# Patient Record
Sex: MALE | Race: BLACK OR AFRICAN AMERICAN | NOT HISPANIC OR LATINO | Employment: OTHER | URBAN - METROPOLITAN AREA
[De-identification: names, ages, dates, MRNs, and addresses within clinical notes are randomized per-mention and may not be internally consistent; named-entity substitution may affect disease eponyms.]

---

## 2019-01-27 ENCOUNTER — HOSPITAL ENCOUNTER (OUTPATIENT)
Dept: URGENT CARE | Facility: CLINIC | Age: 44
Discharge: HOME | End: 2019-01-27

## 2019-01-29 LAB — BACTERIA SPEC AEROBE CULT: NORMAL

## 2019-08-24 ENCOUNTER — HOSPITAL ENCOUNTER (OUTPATIENT)
Dept: URGENT CARE | Facility: CLINIC | Age: 44
Discharge: HOME | End: 2019-08-24
Attending: NURSE PRACTITIONER

## 2019-10-29 ENCOUNTER — CONVERSION ENCOUNTER (OUTPATIENT)
Dept: FAMILY MEDICINE CLINIC | Facility: CLINIC | Age: 44
End: 2019-10-29

## 2019-10-29 ENCOUNTER — OFFICE VISIT CONVERTED (OUTPATIENT)
Dept: FAMILY MEDICINE CLINIC | Facility: CLINIC | Age: 44
End: 2019-10-29
Attending: NURSE PRACTITIONER

## 2020-01-14 ENCOUNTER — HOSPITAL ENCOUNTER (OUTPATIENT)
Dept: URGENT CARE | Facility: CLINIC | Age: 45
Discharge: HOME | End: 2020-01-14
Attending: NURSE PRACTITIONER

## 2020-01-17 LAB — BACTERIA SPEC AEROBE CULT: NORMAL

## 2020-02-08 ENCOUNTER — HOSPITAL ENCOUNTER (OUTPATIENT)
Dept: URGENT CARE | Facility: CLINIC | Age: 45
Discharge: HOME | End: 2020-02-08

## 2020-02-20 ENCOUNTER — HOSPITAL ENCOUNTER (OUTPATIENT)
Dept: LAB | Facility: HOSPITAL | Age: 45
Discharge: HOME | End: 2020-02-20
Attending: NURSE PRACTITIONER

## 2020-02-20 LAB
25(OH)D3 SERPL-MCNC: 19.5 NG/ML (ref 30–100)
APPEARANCE UR: CLEAR
BILIRUB UR QL: NEGATIVE
COLOR UR: YELLOW
CONV COLLECTION SOURCE (UA): NORMAL
CONV UROBILINOGEN IN URINE BY AUTOMATED TEST STRIP: 0.2 {EHRLICHU}/DL (ref 0.1–1)
GLUCOSE UR QL: NEGATIVE MG/DL
HGB UR QL STRIP: NEGATIVE
KETONES UR QL STRIP: NEGATIVE MG/DL
LEUKOCYTE ESTERASE UR QL STRIP: NEGATIVE
NITRITE UR QL STRIP: NEGATIVE
PH UR STRIP.AUTO: 5 [PH] (ref 5–8)
PROT UR QL: NEGATIVE MG/DL
SP GR UR: 1.01 (ref 1–1.03)
TESTOST SERPL-MCNC: 165 NG/DL (ref 249–836)

## 2020-02-24 ENCOUNTER — OFFICE VISIT CONVERTED (OUTPATIENT)
Dept: FAMILY MEDICINE CLINIC | Facility: CLINIC | Age: 45
End: 2020-02-24
Attending: NURSE PRACTITIONER

## 2020-03-09 ENCOUNTER — OFFICE VISIT CONVERTED (OUTPATIENT)
Dept: FAMILY MEDICINE CLINIC | Facility: CLINIC | Age: 45
End: 2020-03-09
Attending: NURSE PRACTITIONER

## 2020-05-04 ENCOUNTER — CONVERSION ENCOUNTER (OUTPATIENT)
Dept: FAMILY MEDICINE CLINIC | Facility: CLINIC | Age: 45
End: 2020-05-04

## 2020-05-04 ENCOUNTER — OFFICE VISIT CONVERTED (OUTPATIENT)
Dept: FAMILY MEDICINE CLINIC | Facility: CLINIC | Age: 45
End: 2020-05-04
Attending: NURSE PRACTITIONER

## 2020-05-22 ENCOUNTER — HOSPITAL ENCOUNTER (OUTPATIENT)
Dept: PHYSICAL THERAPY | Facility: CLINIC | Age: 45
Setting detail: RECURRING SERIES
Discharge: HOME | End: 2020-09-18
Attending: NURSE PRACTITIONER

## 2020-06-01 ENCOUNTER — HOSPITAL ENCOUNTER (OUTPATIENT)
Dept: LAB | Facility: HOSPITAL | Age: 45
Discharge: HOME | End: 2020-06-01
Attending: NURSE PRACTITIONER

## 2020-06-01 LAB
25(OH)D3 SERPL-MCNC: 22.7 NG/ML (ref 30–100)
ALBUMIN SERPL-MCNC: 4.6 G/DL (ref 3.5–5)
ALBUMIN/GLOB SERPL: 1.6 {RATIO} (ref 1.4–2.6)
ALP SERPL-CCNC: 70 U/L (ref 53–128)
ALT SERPL-CCNC: 34 U/L (ref 10–40)
ANION GAP SERPL CALC-SCNC: 19 MMOL/L (ref 8–19)
AST SERPL-CCNC: 37 U/L (ref 15–50)
BASOPHILS # BLD AUTO: 0.04 10*3/UL (ref 0–0.2)
BASOPHILS NFR BLD AUTO: 0.9 % (ref 0–3)
BILIRUB SERPL-MCNC: 0.83 MG/DL (ref 0.2–1.3)
BUN SERPL-MCNC: 12 MG/DL (ref 5–25)
BUN/CREAT SERPL: 9 {RATIO} (ref 6–20)
CALCIUM SERPL-MCNC: 9.4 MG/DL (ref 8.7–10.4)
CHLORIDE SERPL-SCNC: 104 MMOL/L (ref 99–111)
CONV ABS IMM GRAN: 0.02 10*3/UL (ref 0–0.2)
CONV CO2: 22 MMOL/L (ref 22–32)
CONV IMMATURE GRAN: 0.4 % (ref 0–1.8)
CONV TOTAL PROTEIN: 7.4 G/DL (ref 6.3–8.2)
CREAT UR-MCNC: 1.32 MG/DL (ref 0.7–1.2)
DEPRECATED RDW RBC AUTO: 43.1 FL (ref 35.1–43.9)
EOSINOPHIL # BLD AUTO: 0.07 10*3/UL (ref 0–0.7)
EOSINOPHIL # BLD AUTO: 1.5 % (ref 0–7)
ERYTHROCYTE [DISTWIDTH] IN BLOOD BY AUTOMATED COUNT: 12.9 % (ref 11.6–14.4)
GFR SERPLBLD BASED ON 1.73 SQ M-ARVRAT: >60 ML/MIN/{1.73_M2}
GLOBULIN UR ELPH-MCNC: 2.8 G/DL (ref 2–3.5)
GLUCOSE SERPL-MCNC: 120 MG/DL (ref 70–99)
HCT VFR BLD AUTO: 39.7 % (ref 42–52)
HGB BLD-MCNC: 13 G/DL (ref 14–18)
LYMPHOCYTES # BLD AUTO: 1.69 10*3/UL (ref 1–5)
LYMPHOCYTES NFR BLD AUTO: 36.8 % (ref 20–45)
MCH RBC QN AUTO: 29.9 PG (ref 27–31)
MCHC RBC AUTO-ENTMCNC: 32.7 G/DL (ref 33–37)
MCV RBC AUTO: 91.3 FL (ref 80–96)
MONOCYTES # BLD AUTO: 0.3 10*3/UL (ref 0.2–1.2)
MONOCYTES NFR BLD AUTO: 6.5 % (ref 3–10)
NEUTROPHILS # BLD AUTO: 2.47 10*3/UL (ref 2–8)
NEUTROPHILS NFR BLD AUTO: 53.9 % (ref 30–85)
NRBC CBCN: 0 % (ref 0–0.7)
OSMOLALITY SERPL CALC.SUM OF ELEC: 293 MOSM/KG (ref 273–304)
PLATELET # BLD AUTO: 196 10*3/UL (ref 130–400)
PMV BLD AUTO: 10.6 FL (ref 9.4–12.4)
POTASSIUM SERPL-SCNC: 4 MMOL/L (ref 3.5–5.3)
RBC # BLD AUTO: 4.35 10*6/UL (ref 4.7–6.1)
SODIUM SERPL-SCNC: 141 MMOL/L (ref 135–147)
TESTOST SERPL-MCNC: 166 NG/DL (ref 249–836)
WBC # BLD AUTO: 4.59 10*3/UL (ref 4.8–10.8)

## 2020-12-16 ENCOUNTER — OFFICE VISIT CONVERTED (OUTPATIENT)
Dept: FAMILY MEDICINE CLINIC | Facility: CLINIC | Age: 45
End: 2020-12-16
Attending: STUDENT IN AN ORGANIZED HEALTH CARE EDUCATION/TRAINING PROGRAM

## 2021-01-29 ENCOUNTER — OFFICE VISIT CONVERTED (OUTPATIENT)
Dept: FAMILY MEDICINE CLINIC | Facility: CLINIC | Age: 46
End: 2021-01-29
Attending: NURSE PRACTITIONER

## 2021-01-29 ENCOUNTER — CONVERSION ENCOUNTER (OUTPATIENT)
Dept: FAMILY MEDICINE CLINIC | Facility: CLINIC | Age: 46
End: 2021-01-29

## 2021-02-04 ENCOUNTER — HOSPITAL ENCOUNTER (OUTPATIENT)
Dept: LAB | Facility: HOSPITAL | Age: 46
Discharge: HOME | End: 2021-02-04
Attending: NURSE PRACTITIONER

## 2021-02-04 LAB
ALBUMIN SERPL-MCNC: 4.3 G/DL (ref 3.5–5)
ALBUMIN/GLOB SERPL: 1.5 {RATIO} (ref 1.4–2.6)
ALP SERPL-CCNC: 89 U/L (ref 53–128)
ALT SERPL-CCNC: 25 U/L (ref 10–40)
ANION GAP SERPL CALC-SCNC: 17 MMOL/L (ref 8–19)
AST SERPL-CCNC: 24 U/L (ref 15–50)
BASOPHILS # BLD AUTO: 0.04 10*3/UL (ref 0–0.2)
BASOPHILS NFR BLD AUTO: 0.8 % (ref 0–3)
BILIRUB SERPL-MCNC: 1.17 MG/DL (ref 0.2–1.3)
BUN SERPL-MCNC: 12 MG/DL (ref 5–25)
BUN/CREAT SERPL: 10 {RATIO} (ref 6–20)
CALCIUM SERPL-MCNC: 8.9 MG/DL (ref 8.7–10.4)
CHLORIDE SERPL-SCNC: 102 MMOL/L (ref 99–111)
CHOLEST SERPL-MCNC: 205 MG/DL (ref 107–200)
CHOLEST/HDLC SERPL: 4.5 {RATIO} (ref 3–6)
CONV ABS IMM GRAN: 0.01 10*3/UL (ref 0–0.2)
CONV CO2: 27 MMOL/L (ref 22–32)
CONV IMMATURE GRAN: 0.2 % (ref 0–1.8)
CONV TOTAL PROTEIN: 7.1 G/DL (ref 6.3–8.2)
CREAT UR-MCNC: 1.19 MG/DL (ref 0.7–1.2)
DEPRECATED RDW RBC AUTO: 40.7 FL (ref 35.1–43.9)
EOSINOPHIL # BLD AUTO: 0.09 10*3/UL (ref 0–0.7)
EOSINOPHIL # BLD AUTO: 1.7 % (ref 0–7)
ERYTHROCYTE [DISTWIDTH] IN BLOOD BY AUTOMATED COUNT: 12.9 % (ref 11.6–14.4)
EST. AVERAGE GLUCOSE BLD GHB EST-MCNC: 123 MG/DL
GFR SERPLBLD BASED ON 1.73 SQ M-ARVRAT: >60 ML/MIN/{1.73_M2}
GLOBULIN UR ELPH-MCNC: 2.8 G/DL (ref 2–3.5)
GLUCOSE SERPL-MCNC: 99 MG/DL (ref 70–99)
HBA1C MFR BLD: 5.9 % (ref 3.5–5.7)
HCT VFR BLD AUTO: 41 % (ref 42–52)
HDLC SERPL-MCNC: 46 MG/DL (ref 40–60)
HGB BLD-MCNC: 13.9 G/DL (ref 14–18)
LDLC SERPL CALC-MCNC: 98 MG/DL (ref 70–100)
LYMPHOCYTES # BLD AUTO: 2.59 10*3/UL (ref 1–5)
LYMPHOCYTES NFR BLD AUTO: 49.3 % (ref 20–45)
MCH RBC QN AUTO: 29.4 PG (ref 27–31)
MCHC RBC AUTO-ENTMCNC: 33.9 G/DL (ref 33–37)
MCV RBC AUTO: 86.9 FL (ref 80–96)
MONOCYTES # BLD AUTO: 0.31 10*3/UL (ref 0.2–1.2)
MONOCYTES NFR BLD AUTO: 5.9 % (ref 3–10)
NEUTROPHILS # BLD AUTO: 2.21 10*3/UL (ref 2–8)
NEUTROPHILS NFR BLD AUTO: 42.1 % (ref 30–85)
NRBC CBCN: 0 % (ref 0–0.7)
OSMOLALITY SERPL CALC.SUM OF ELEC: 294 MOSM/KG (ref 273–304)
PLATELET # BLD AUTO: 169 10*3/UL (ref 130–400)
PMV BLD AUTO: 9.9 FL (ref 9.4–12.4)
POTASSIUM SERPL-SCNC: 3.5 MMOL/L (ref 3.5–5.3)
RBC # BLD AUTO: 4.72 10*6/UL (ref 4.7–6.1)
SODIUM SERPL-SCNC: 142 MMOL/L (ref 135–147)
TRIGL SERPL-MCNC: 304 MG/DL (ref 40–150)
TSH SERPL-ACNC: 2.83 M[IU]/L (ref 0.27–4.2)
VLDLC SERPL-MCNC: 61 MG/DL (ref 5–37)
WBC # BLD AUTO: 5.25 10*3/UL (ref 4.8–10.8)

## 2021-03-04 ENCOUNTER — OFFICE VISIT CONVERTED (OUTPATIENT)
Dept: NEUROSURGERY | Facility: CLINIC | Age: 46
End: 2021-03-04
Attending: PHYSICIAN ASSISTANT

## 2021-03-09 ENCOUNTER — HOSPITAL ENCOUNTER (OUTPATIENT)
Dept: LAB | Facility: HOSPITAL | Age: 46
Discharge: HOME | End: 2021-03-09
Attending: NURSE PRACTITIONER

## 2021-03-09 ENCOUNTER — OFFICE VISIT CONVERTED (OUTPATIENT)
Dept: FAMILY MEDICINE CLINIC | Facility: CLINIC | Age: 46
End: 2021-03-09
Attending: NURSE PRACTITIONER

## 2021-03-09 LAB — TESTOST SERPL-MCNC: 190 NG/DL (ref 249–836)

## 2021-04-28 ENCOUNTER — HOSPITAL ENCOUNTER (OUTPATIENT)
Dept: LAB | Facility: HOSPITAL | Age: 46
Discharge: HOME | End: 2021-04-28
Attending: NURSE PRACTITIONER

## 2021-04-28 ENCOUNTER — OFFICE VISIT CONVERTED (OUTPATIENT)
Dept: FAMILY MEDICINE CLINIC | Facility: CLINIC | Age: 46
End: 2021-04-28
Attending: NURSE PRACTITIONER

## 2021-04-28 ENCOUNTER — CONVERSION ENCOUNTER (OUTPATIENT)
Dept: FAMILY MEDICINE CLINIC | Facility: CLINIC | Age: 46
End: 2021-04-28

## 2021-04-29 LAB — HCV AB S/CO SERPL IA: <0.1 S/CO RATIO (ref 0–0.9)

## 2021-05-10 NOTE — H&P
History and Physical      Patient Name: Sameer Méndez   Patient ID: [id number]   Sex: Male   YOB: 1975    Primary Care Provider: Tre VALIENTE   Referring Provider: Tre VALIENTE    Visit Date: March 4, 2021    Provider: Roya Kruger PA-C   Location: [facility]   Location Address: [address] [place] [address]  [id number]   Location Phone: [phone]          Chief Complaint     Low back and right leg pain. Left leg numbness.       History Of Present Illness  The patient is a 46 year old /Black male, who presents on referral from Tre VALIENTE, for a neurosurgical evaluation of low back pain, right leg pain, and paresthesias in the left leg.   The right leg pain involves the right lower leg in a nonspecific distribution. The pain developed gradually several years but has gotten progressively worse. It is severe (7-8/10) has an aching and a sharp quality and radiates into the right foot in a nonspecific distribution. The pain has been constant. The pain tends to be maximal at night and the pain interferes with sleep. The patient states the pain is aggravated by prolonged standing, walking long distances, staying in one position for extended periods, bending, and lifting. No alleviating factors are reported. The paresthesias involve the left anterior and lateral thigh distribution.   He denies bowel or bladder dysfunction. The patient has no prior history of neck or back surgery.   RECENT INTERVENTIONS:  He has been previously treated with physical therapy and gabapentin. The physical therapy was not helpful. Pain worse with lumbar traction.   INFORMATION REVIEWED:  The following information was reviewed: radiology reports and images. MRI of the lumbar spine and from [facility] from October 2019 (not on PACS) revealed mild degenerative changes.      The left lateral thigh numbness started after the lumbar traction.   Notices it more when lying in the bed.  The right leg pain has been ongoing for years.       Past Medical History  Degeneration of lumbar intervertebral disc; Foot pain; Gout; Hypertension; Knee Pain; Lumbago/low back pain; Lumbosacral disc herniation, L5-S1; Shoulder pain         Past Surgical History  *No Past Surgical History         Medication List  amlodipine 10 mg oral tablet; colchicine 0.6 mg oral tablet; cyclobenzaprine 10 mg oral tablet; diclofenac sodium 50 mg oral tablet,delayed release (DR/EC); febuxostat 80 mg oral tablet; gabapentin 600 mg oral tablet; Linzess 145 mcg oral capsule; losartan 100 mg oral tablet; melatonin 3 mg oral capsule; prednisone 50 mg oral tablet; rizatriptan 10 mg oral tablet; rosuvastatin 20 mg oral tablet; spironolactone 25 mg oral tablet; tadalafil 5 mg oral tablet; testosterone cypionate 200 mg/mL intramuscular oil; Toprol XL 50 mg oral tablet extended release 24 hr; Vitamin D2 50,000 unit oral capsule         Allergy List  ibuprofen; Motrin; PENICILLINS; Viagra       Allergies Reconciled  Family Medical History  *Non Contributory         Social History  Tobacco (Never)         Immunizations  Name Date Admin   Influenza Refused   Influenza Refused         Review of Systems  · Constitutional  o Denies  o : chills, excessive sweating, fatigue, fever, sycope/passing out, weight gain, weight loss  · Eyes  o Denies  o : changes in vision, blurry vision, double vision  · HENT  o Admits  o : nasal congestion, sinus pain, nose bleeds, seasonal allergies  o Denies  o : loss of hearing, ringing in the ears, ear aches, sore throat  · Cardiovascular  o Denies  o : blood clots, swollen legs, anemia, easy burising or bleeding, transfusions  · Respiratory  o Denies  o : shortness of breath, dry cough, productive cough, pneumonia, COPD  · Gastrointestinal  o Denies  o : difficulty swallowing, reflux  · Genitourinary  o Admits  o : erectile dysfunction  o Denies  o :  "incontinence  · Neurologic  o Admits  o : headache, loss of balance, falls, difficulty with sleep, numbness/tingling/paresthesia   o Denies  o : seizure, stroke, tremor, dizziness/vertigo, difficulty with coordination, difficulty with dexterity, weakness  · Musculoskeletal  o Admits  o : neck stiffness/pain, muscle aches, joint pain, weakness, spasms, sciatica, pain radiating in arm, pain radiating in leg, low back pain  o Denies  o : swollen lymph nodes  · Endocrine  o Denies  o : diabetes, thyroid disorder  · Psychiatric  o Admits  o : anxiety, depression  · All Others Negative      Vitals  Date Time BP Position Site L\R Cuff Size HR RR TEMP (F) WT  HT  BMI kg/m2 BSA m2 O2 Sat FR L/min FiO2 HC       03/04/2021 08:47 AM        97.4 255lbs 0oz 5'  10\" 36.59 2.39             Physical Examination  · Constitutional  o Appearance  o : well-nourished, well developed, alert, in no acute distress  · Respiratory  o Respiratory Effort  o : breathing unlabored  · Cardiovascular  o Peripheral Vascular System  o :   § Extremities  § : no edema or cyanosis  · Musculoskeletal  o Spine  o :   § Inspection/Palpation  § : no spinal tenderness or misalignment  o Right Lower Extremity  o :   § Inspection/Palpation  § : no joint or limb tenderness to palpation, no edema present, no ecchymosis  § Joint Stability  § : joint stability within normal limits  § Range of Motion  § : range of motion normal, no joint crepitations present, no pain on motion, Guido's test negative  o Left Lower Extremity  o :   § Inspection/Palpation  § : no joint or limb tenderness to palpation, no edema present, no ecchymosis  § Joint Stability  § : joint stability within normal limits  § Range of Motion  § : range of motion normal, no joint crepitations present, no pain on motion, Guido's test negative  · Skin and Subcutaneous Tissue  o Extremities  o :   § Right Lower Extremity  § : no lesions or areas of discoloration  § Left Lower Extremity  § : no " lesions or areas of discoloration  o Back  o : no lesions or areas of discoloration  · Neurologic  o Mental Status Examination  o :   § Orientation  § : alert and oriented to time, person, place and events  o Motor Examination  o :   § RLE Strength  § : strength normal  § RLE Motor Function  § : tone normal, no atrophy, no abnormal movements noted  § LLE Strength  § : strength normal  § LLE Motor Function  § : tone normal, no atrophy, no abnormal movements noted  o Reflexes  o :   § RLE  § : knee and ankle reflexes 1/4, SLR negative  § LLE  § : knee and ankle reflexes 1/4, SLR negative  o Sensation  o :   § Light Touch  § : sensation intact to light touch in extremities  o Gait and Station  o :   § Gait Screening  § : using a cane  · Psychiatric  o Mood and Affect  o : mood normal, affect appropriate          Assessment  · Degeneration of lumbar intervertebral disc     722.52/M51.36  · Lumbago/low back pain     724.3/M54.40  · Paresthesia     782.0/R20.2  · Radiculopathy, lumbosacral     724.4/M54.16      Plan  · Orders  o ELAINE REPORT ([facility]) - - 03/04/2021  o MRI lumbar spine wo contrast ([id number]) - 722.52/M51.36, 724.3/M54.40, 782.0/R20.2, 724.4/M54.16 - 03/04/2021   [facility] for Open MRI please  · Medications  o Medications have been Reconciled  o Transition of Care or Provider Policy  · Instructions  o Encouraged to follow-up with Primary Care Provider for preventative care.  o The ROS and the PFSH were reviewed at today's visit.  o Call or return to office if symptoms worsen or persist.  o I will send him for new MRI lumbar spine due to worsening pain in the low back and right leg with new onset of left thigh numbness.   · Associate Tasks  o Task ID [id number] General Task: elaine and pily back to me for valium for prior to MRI            Electronically Signed by: YANNICK JacobsonC -Author on March 4, 2021 09:07:23 AM

## 2021-05-12 ENCOUNTER — OFFICE VISIT CONVERTED (OUTPATIENT)
Dept: NEUROSURGERY | Facility: CLINIC | Age: 46
End: 2021-05-12
Attending: PHYSICIAN ASSISTANT

## 2021-05-14 VITALS
TEMPERATURE: 98.1 F | OXYGEN SATURATION: 97 % | HEIGHT: 70 IN | SYSTOLIC BLOOD PRESSURE: 142 MMHG | BODY MASS INDEX: 36.51 KG/M2 | HEART RATE: 84 BPM | DIASTOLIC BLOOD PRESSURE: 86 MMHG | WEIGHT: 255 LBS

## 2021-05-14 VITALS — BODY MASS INDEX: 36.51 KG/M2 | WEIGHT: 255 LBS | TEMPERATURE: 97.4 F | HEIGHT: 70 IN

## 2021-05-14 VITALS
HEIGHT: 70 IN | TEMPERATURE: 97.2 F | OXYGEN SATURATION: 92 % | RESPIRATION RATE: 16 BRPM | HEART RATE: 89 BPM | DIASTOLIC BLOOD PRESSURE: 98 MMHG | BODY MASS INDEX: 37.26 KG/M2 | WEIGHT: 260.25 LBS | SYSTOLIC BLOOD PRESSURE: 156 MMHG

## 2021-05-14 VITALS
SYSTOLIC BLOOD PRESSURE: 142 MMHG | DIASTOLIC BLOOD PRESSURE: 102 MMHG | TEMPERATURE: 97.2 F | HEIGHT: 70 IN | BODY MASS INDEX: 35.79 KG/M2 | WEIGHT: 250 LBS | OXYGEN SATURATION: 97 % | HEART RATE: 72 BPM

## 2021-05-14 VITALS
WEIGHT: 257 LBS | OXYGEN SATURATION: 96 % | HEART RATE: 69 BPM | HEIGHT: 70 IN | SYSTOLIC BLOOD PRESSURE: 150 MMHG | BODY MASS INDEX: 36.79 KG/M2 | DIASTOLIC BLOOD PRESSURE: 96 MMHG | TEMPERATURE: 98.6 F

## 2021-05-14 NOTE — PROGRESS NOTES
Progress Note      Patient Name: Sameer Méndez   Patient ID: [id number]   Sex: Male   YOB: 1975    Primary Care Provider: Tre VALIENTE    Visit Date: January 29, 2021    Provider: STEFFANIE Schmitt   Location: [facility]   Location Address: [address] [place] [address], [address]  [place], [place]  [id number]   Location Phone: [phone]          Chief Complaint  · Back pain      History Of Present Illness  Sameer Méndez is a 45 year old /Black male who presents for evaluation and treatment of:      Patient presents to the office today regarding his low back pain.  Patient states that he has dealt with this back pain for many years and was in physical therapy.  Patient states that he recently underwent traction with physical therapy and he seems to feel as if the pain has increased since then.  Patient has been taking his gabapentin as well as his anti-inflammatories without any improvement.  Patient states that the pain is now radiating down his leg he is having some mild numbness in his extremities.  Patient's last MRI was in 2019.  I did discuss referring patient to neurosurgery for their evaluation  Patient denies any urinary or bowel continence.    Patient also states that he would like to have all his routine lab work completed.  [facility] but he states that he would like for us to do it to evaluate his overall health.  Patient's blood pressure on arrival is 142/80.  He denies any chest pain shortness breath palpitations at this time.  Patient does state that he has discontinued the duloxetine because he did not like the way it made him feel.  Patient was taking this for his anxiety as well as his pain.  Patient states that since starting the Toprol all that this has helped his anxiety quite a bit.  Patient also complains of constipation.  He states that the Linzess that was given to him in the past worked  very well and he would like a refill of this medication.       Past Medical History  Disease Name Date Onset Notes   Degeneration of lumbar intervertebral disc 09/24/2013 --    Foot pain --  --    Gout --  --    Hypertension --  --    Knee Pain --  --    Lumbago/low back pain 09/24/2013 --    Lumbosacral disc herniation, L5-S1 09/24/2013 MRI of the Lumbar Spine was reviewed which shows multi-level DDD, most significant at L5-S1 with mild buldging at L4-5 and L5-S1. No evidence of any disc herniations or significant nerve root compression.   Shoulder pain --  --          Past Surgical History  Procedure Name Date Notes   *No Past Surgical History --  --          Medication List  Name Date Started Instructions   amlodipine 10 mg oral tablet 10/29/2019 take 1 tablet (10 mg) by oral route once daily for 90 days   colchicine 0.6 mg oral tablet 11/13/2019 take 2 tablets (1.2 mg) by oral route once daily for 90 days   cyclobenzaprine 10 mg oral tablet 12/16/2020 take 1 tablet by oral route 2 times a day as needed for 30 days   diclofenac sodium 50 mg oral tablet,delayed release (DR/EC) 03/09/2020 take 1 tablet (50 mg) by oral route 2 times per day   febuxostat 80 mg oral tablet  take 1 tablet (80 mg) by oral route once daily for gout   gabapentin 600 mg oral tablet 10/29/2019 take 1 1/2 tablet by oral route TID as needed   losartan 100 mg oral tablet  take 1 tablet (100 mg) by oral route once daily   melatonin 3 mg oral capsule  take 2 capsules by oral route at bedtime   rizatriptan 10 mg oral tablet  take 1 tablet (10 mg) by oral route once, may repeat at 2 hour intervals; do not exceed 30 mg in 24 hours   rosuvastatin 20 mg oral tablet 02/25/2020 take 1 tablet by oral route once a day (at bedtime)   spironolactone 25 mg oral tablet  take 1 tablet (25 mg) by oral route once daily   tadalafil 5 mg oral tablet  take 2 tablets (10 mg) by oral route as needed approximately 1 hour before sexual activity   testosterone  "cypionate 200 mg/mL intramuscular oil 04/08/2020 inject 0.5 milliliter by intramuscular route every 2 weeks   Toprol XL 50 mg oral tablet extended release 24 hr 02/25/2020 take 1 tablet (50 mg) by oral route once daily   Vitamin D2 50,000 unit oral capsule 02/25/2020 take 1 capsule by oral route once a week for 90 days         Allergy List  Allergen Name Date Reaction Notes   ibuprofen --  --  --    PENICILLINS --  --  --    Viagra --  --  --          Family Medical History  Disease Name Relative/Age Notes   *Non Contributory  --          Social History  Finding Status Start/Stop Quantity Notes   Tobacco Never --/-- --  --          Immunizations  NameDate Admin Mfg Trade Name Lot Number Route Inj VIS Given VIS Publication   InfluenzaRefused 01/29/2021 NE Not Entered  NE NE     Comments:          Review of Systems  · Constitutional  o Denies  o : fever, fatigue, weight loss, weight gain  · Cardiovascular  o Denies  o : lower extremity edema, claudication, chest pressure, palpitations  · Respiratory  o Denies  o : shortness of breath, wheezing, cough, hemoptysis, dyspnea on exertion  · Gastrointestinal  o Admits  o : constipation  o Denies  o : nausea, vomiting, diarrhea, abdominal pain  · Musculoskeletal  o Admits  o : back pain      Vitals  Date Time BP Position Site L\R Cuff Size HR RR TEMP (F) WT  HT  BMI kg/m2 BSA m2 O2 Sat FR L/min FiO2 HC       01/29/2021 08:00 /86 Sitting    84 - R  98.1 255lbs 0oz 5'  10\" 36.59 2.39 97 %            Physical Examination  · Constitutional  o Appearance  o : well-nourished, in no acute distress  · Neck  o Inspection/Palpation  o : normal appearance, no masses or tenderness, trachea midline  o Range of Motion  o : cervical range of motion within normal limits  o Thyroid  o : gland size normal, nontender, no nodules or masses present on palpation  · Respiratory  o Respiratory Effort  o : breathing unlabored  o Inspection of Chest  o : normal appearance  o Auscultation of " Lungs  o : normal breath sounds throughout inspiration and expiration  · Cardiovascular  o Heart  o :   § Auscultation of Heart  § : regular rate and rhythm, no murmurs, gallops or rubs  o Peripheral Vascular System  o :   § Extremities  § : no clubbing or edema  · Gastrointestinal  o Abdominal Examination  o : abdomen nontender to palpation, tone normal without rigidity or guarding, no masses present, bowel sounds present in all four quadrants  · Skin and Subcutaneous Tissue  o General Inspection  o : no rashes or lesions present, no areas of discoloration  o Body Hair  o : hair normal for age, general body hair distribution normal for age  o Digits and Nails  o : no clubbing, cyanosis, deformities or edema present, normal appearing nails  · Neurologic  o Mental Status Examination  o :   § Orientation  § : grossly oriented to person, place and time  o Gait and Station  o : normal gait, able to stand without difficulty  · Psychiatric  o Judgement and Insight  o : judgment and insight intact  o Mood and Affect  o : mood normal, affect appropriate  o Presence of Abnormal Thoughts  o : no hallucinations, no delusions present, no psychotic thoughts  · Back  o Inspection  o : no gross deformities  o Palpation  o : right lumbar tenderness noted,   o Range of Motion  o : normal range of motion  o Gait  o : normal gait          Assessment  · Essential hypertension     401.9/I10  · Hyperlipidemia     272.4/E78.5  · Hypogonadism, male     257.2/E29.1  · Chronic right-sided low back pain with right-sided sciatica       Lumbago with sciatica, right side     724.2/M54.41  Other chronic pain     724.2/G89.29  · Constipation     564.00/K59.00      Plan  · Orders  o Hgb A1c [facility] ([id number]) - - 01/29/2021  o Physical, Primary Care Panel (CBC, CMP, Lipid, TSH) [facility] ([id number], [id number], [id number], [id number]) - - 01/29/2021  o ACO-39: Current medications updated and reviewed (, 1159F) - - 01/29/2021  o NEUROSURGEON CONSULTATION (NEU) - -  01/29/2021  · Medications  o Linzess 145 mcg oral capsule   SIG: take 1 capsule (145 mcg) by oral route once daily on an empty stomach at least 30 minutes before 1st meal of the day   DISP: (90) Capsule with 0 refills  Prescribed on 01/29/2021     o prednisone 50 mg oral tablet   SIG: take 1 tablet (50 mg) by oral route once daily for 5 days   DISP: (5) Tablet with 0 refills  Prescribed on 01/29/2021     o Medications have been Reconciled  o Transition of Care or Provider Policy  · Instructions  o Advised that cheeses and other sources of dairy fats, animal fats, fast food, and the extras (candy, pastries, pies, doughnuts and cookies) all contain LDL raising nutrients. Advised to increase fruits, vegetables, whole grains, and to monitor portion sizes.   o Patient was educated/instructed on their diagnosis, treatment and medications prior to discharge from the clinic today.  o Minutes spent with patient including greater than 50% in Education/Counseling/Care Coordination.  o Time spent with the patient was minutes, more than 50% face to face.  o Flu vaccine declined.  o Electronically Identified Patient Education Materials Provided Electronically  · Disposition  o Call or Return if symptoms worsen or persist.  o Follow up as scheduled  o Care Transition  o RASTA Sent            Electronically Signed by: STEFFANIE Schmitt -Author on January 29, 2021 10:44:42 AM

## 2021-05-14 NOTE — PROGRESS NOTES
Progress Note      Patient Name: Sameer Méndez   Patient ID: [id number]   Sex: Male   YOB: 1975    Primary Care Provider: Tre VALIENTE   Referring Provider: Tre VALIENTE    Visit Date: March 9, 2021    Provider: STEFFANIE Schmitt   [place] [facility] [place] Address: [address] [place] [address], [address] [place] [address]  [id number]   Location Phone: [phone]          Chief Complaint  · C/O nose bleeds and dry nose      History Of Present Illness  Sameer Méndez is a 46 year old /Black male who presents for evaluation and treatment of:      Presents to the office today to discuss frequent nosebleeds.  Patient does state that he has been using his CPAP machine and the water tank has been filled and it seems to be working although when he wakes up he has nose is very dry.  He states this is when he notices the nosebleeds occurring.  He states that they are not heavy nosebleeds he states that it is very mild and it is easily controlled.  Patient does state its been at least 3 or 4 years since he has seen his pulmonologist regarding his sleep apnea machine.  He does state that he would like a referral to go back to discuss the machine, the settings and to ensure that it is working properly.  Patient's blood pressure on arrival 142/102.  He denies any chest pain shortness breath palpitations time.  Patient does state that he had taken his medication just prior to coming in.  I did asked the patient if he had been checking his blood pressure on a regular basis at home.  He does state that it has been running in the 150s over 100s.  I did discuss increasing his Toprol XL to 100 mg daily.  Patient verbalized understanding.  He states that he does have the 100 mg tablets at home.    Patient states that he has not been on his testosterone injections for some time now.  He would like to have his testosterone levels rechecked.       Past Medical  History  Disease Name Date Onset Notes   Degeneration of lumbar intervertebral disc 09/24/2013 --    Foot pain --  --    Gout --  --    Hypertension --  --    Knee Pain --  --    Lumbago/low back pain 09/24/2013 --    Lumbosacral disc herniation, L5-S1 09/24/2013 MRI of the Lumbar Spine was reviewed which shows multi-level DDD, most significant at L5-S1 with mild buldging at L4-5 and L5-S1. No evidence of any disc herniations or significant nerve root compression.   Shoulder pain --  --          Past Surgical History  Procedure Name Date Notes   *No Past Surgical History --  --          Medication List  Name Date Started Instructions   amlodipine 10 mg oral tablet 10/29/2019 take 1 tablet (10 mg) by oral route once daily for 90 days   colchicine 0.6 mg oral tablet 11/13/2019 take 2 tablets (1.2 mg) by oral route once daily for 90 days   cyclobenzaprine 10 mg oral tablet 12/16/2020 take 1 tablet by oral route 2 times a day as needed for 30 days   diclofenac sodium 50 mg oral tablet,delayed release (DR/EC) 03/09/2020 take 1 tablet (50 mg) by oral route 2 times per day   febuxostat 80 mg oral tablet  take 1 tablet (80 mg) by oral route once daily for gout   gabapentin 600 mg oral tablet 10/29/2019 take 1 1/2 tablet by oral route TID as needed   Linzess 145 mcg oral capsule 01/29/2021 take 1 capsule (145 mcg) by oral route once daily on an empty stomach at least 30 minutes before 1st meal of the day   losartan 100 mg oral tablet  take 1 tablet (100 mg) by oral route once daily   melatonin 3 mg oral capsule  take 2 capsules by oral route at bedtime   prednisone 50 mg oral tablet 01/29/2021 take 1 tablet (50 mg) by oral route once daily for 5 days   rizatriptan 10 mg oral tablet  take 1 tablet (10 mg) by oral route once, may repeat at 2 hour intervals; do not exceed 30 mg in 24 hours   rosuvastatin 20 mg oral tablet 02/25/2020 take 1 tablet by oral route once a day (at bedtime)   tadalafil 5 mg oral tablet  take 2 tablets  "(10 mg) by oral route as needed approximately 1 hour before sexual activity   testosterone cypionate 200 mg/mL intramuscular oil 04/08/2020 inject 0.5 milliliter by intramuscular route every 2 weeks   Toprol XL 50 mg oral tablet extended release 24 hr 02/25/2020 take 1 tablet (50 mg) by oral route once daily   Valium 5 mg oral tablet 03/04/2021 one po 30 minutes prior to MRI   Vitamin D2 50,000 unit oral capsule 02/25/2020 take 1 capsule by oral route once a week for 90 days         Allergy List  Allergen Name Date Reaction Notes   ibuprofen --  --  --    Motrin --  --  --    PENICILLINS --  --  --    Viagra --  --  --          Family Medical History  Disease Name Relative/Age Notes   *Non Contributory  --          Social History  Finding Status Start/Stop Quantity Notes   Tobacco Never --/-- --  --          Immunizations  NameDate Admin Mfg Trade Name Lot Number Route Inj VIS Given VIS Publication   InfluenzaRefused 01/29/2021 NE Not Entered  NE NE     Comments:          Review of Systems  · Constitutional  o Denies  o : fever, fatigue, weight loss, weight gain  · Cardiovascular  o Denies  o : lower extremity edema, claudication, chest pressure, palpitations  · Respiratory  o Denies  o : shortness of breath, wheezing, cough, hemoptysis, dyspnea on exertion  · Gastrointestinal  o Denies  o : nausea, vomiting, diarrhea, constipation, abdominal pain      Vitals  Date Time BP Position Site L\R Cuff Size HR RR TEMP (F) WT  HT  BMI kg/m2 BSA m2 O2 Sat FR L/min FiO2 HC       03/09/2021 08:26 /102 Sitting    72 - R  97.2 250lbs 0oz 5'  10\" 35.87 2.37 97 %            Physical Examination  · Constitutional  o Appearance  o : well-nourished, in no acute distress  · Eyes  o Conjunctivae  o : conjunctivae normal  o Sclerae  o : sclerae white  o Pupils and Irises  o : pupils equal and round  o Eyelids/Ocular Adnexae  o : eyelid appearance normal, no exudates present  · Ears, Nose, Mouth and Throat  o Ears  o :   § External " Ears  § : external auditory canal appearance within normal limits, no discharge present  § Otoscopic Examination  § : tympanic membrane appearance within normal limits bilaterally, cerumen not present  o Nose  o :   § External Nose  § : appearance normal  § Intranasal Exam  § : nasal mucosa inflammation present   § Nasopharynx  § : no discharge present  o Oral Cavity  o :   § Oral Mucosa  § : oral mucosa normal  § Lips  § : lip appearance normal  § Teeth  § : normal dentation for age  o Throat  o :   § Oropharynx  § : no inflammation or lesions present, tonsils within normal limits  · Neck  o Inspection/Palpation  o : normal appearance, no masses or tenderness, trachea midline  o Range of Motion  o : cervical range of motion within normal limits  o Thyroid  o : gland size normal, nontender, no nodules or masses present on palpation  · Respiratory  o Respiratory Effort  o : breathing unlabored  o Inspection of Chest  o : normal appearance  o Auscultation of Lungs  o : normal breath sounds throughout inspiration and expiration  · Cardiovascular  o Heart  o :   § Auscultation of Heart  § : regular rate and rhythm, no murmurs, gallops or rubs  o Peripheral Vascular System  o :   § Extremities  § : no clubbing or edema  · Skin and Subcutaneous Tissue  o General Inspection  o : no rashes or lesions present, no areas of discoloration  o Body Hair  o : hair normal for age, general body hair distribution normal for age  o Digits and Nails  o : no clubbing, cyanosis, deformities or edema present, normal appearing nails  · Neurologic  o Mental Status Examination  o :   § Orientation  § : grossly oriented to person, place and time  o Gait and Station  o : normal gait, able to stand without difficulty  · Psychiatric  o Judgement and Insight  o : judgment and insight intact  o Mood and Affect  o : mood normal, affect appropriate  o Presence of Abnormal Thoughts  o : no hallucinations, no delusions present, no psychotic  thoughts          Assessment  · Screening for depression     V79.0/Z13.89  · Essential hypertension     401.9/I10  · Hyperlipidemia     272.4/E78.5  · Hypogonadism, male     257.2/E29.1  · Sleep apnea     780.57/G47.30  · Epistaxis     784.7/R04.0      Plan  · Orders  o Annual depression screening, 15 minutes (44740, ) - V79.0/Z13.89 - 03/09/2021  o ACO-18: Positive screen for clinical depression using a standardized tool and a follow-up plan documented () - V79.0/Z13.89 - 03/09/2021  o Testosterone (Total) (63328) - 257.2/E29.1 - 03/09/2021  o ACO-18: Positive screen for clinical depression using a standardized tool and a follow-up plan documented () - - 03/09/2021  o ACO-14: Influenza immunization was not administered for reasons documented [facility] () - - 03/09/2021  o ACO-39: Current medications updated and reviewed (1159F, ) - - 03/09/2021  o PULMONARY CONSULTATION (PULMO) - - 03/09/2021   Dr. Callejas  · Medications  o Toprol  mg oral tablet extended release 24 hr   SIG: take 1 tablet (100 mg) by oral route once daily   DISP: (90) Tablet with 1 refills  Adjusted on 03/09/2021     o Medications have been Reconciled  o Transition of Care or Provider Policy  · Instructions  o Depression Screen completed and scanned into the EMR under the designated folder within the patient's documents.  o Today's PHQ-9 result is 6___  o The provider screening met the required time of 15 minutes.  o Patient advised to monitor blood pressure (B/P) at home and journal readings. Patient informed that a B/P reading at home of more than 130/80 is considered hypertension. For readings greater qlnk497/90 or higher patient is advised to follow up in the office with readings for management. Patient advised to limit sodium intake.  o Advised that cheeses and other sources of dairy fats, animal fats, fast food, and the extras (candy, pastries, pies, doughnuts and cookies) all contain LDL raising nutrients. Advised  to increase fruits, vegetables, whole grains, and to monitor portion sizes.   o Patient was educated/instructed on their diagnosis, treatment and medications prior to discharge from the clinic today.  o Minutes spent with patient including greater than 50% in Education/Counseling/Care Coordination.  o Time spent with the patient was minutes, more than 50% face to face.  o Electronically Identified Patient Education Materials Provided Electronically  · Disposition  o Call or Return if symptoms worsen or persist.  o Follow up in 3 months  o Care Transition  o RASTA Sent     I did encourage patient to utilize small amount of Vaseline in his nares before going to bed to help moisturize his mucous membranes.             Electronically Signed by: STEFFANIE Schmitt -Author on March 9, 2021 10:25:51 AM

## 2021-05-14 NOTE — PROGRESS NOTES
Progress Note      Patient Name: Sameer Méndez   Patient ID: [id number]   Sex: Male   YOB: 1975    Primary Care Provider: Tre VALIENTE    Visit Date: December 16, 2020    Provider: Collin Quiros MD   Location: [facility] [place]   Location Address: [address] [place] [address], [address]  [place], [place]  [id number]   Location Phone: [phone]          Chief Complaint     Back/side pain, burning R lower back pain, no urinary symptoms       History Of Present Illness  Sameer Méndez is a 45 year old /Black male who presents for evaluation and treatment of:      45 years old male with past medical history of chronic lower back pain and history of lumbar disc herniation comes to the clinic complaining of right lower back pain.    Patient reports mildly worsening right lower back pain which is burning in nature.  Pain is intermittent, gets worse with certain positions.  Pain lasts for few seconds and then it gets better.  Denies any dysuria/hematuria, denies any constipation or any diarrhea.  Patient do reports history of kidney stones in the past.  Denies any fever/nausea or vomiting.  Denies any urinary or bowel incontinence.       Past Medical History  Disease Name Date Onset Notes   Degeneration of lumbar intervertebral disc 09/24/2013 --    Foot pain --  --    Gout --  --    Hypertension --  --    Knee Pain --  --    Lumbago/low back pain 09/24/2013 --    Lumbosacral disc herniation, L5-S1 09/24/2013 MRI of the Lumbar Spine was reviewed which shows multi-level DDD, most significant at L5-S1 with mild buldging at L4-5 and L5-S1. No evidence of any disc herniations or significant nerve root compression.   Shoulder pain --  --          Past Surgical History  Procedure Name Date Notes   *No Past Surgical History --  --          Medication List  Name Date Started Instructions   amlodipine 10 mg oral tablet 10/29/2019 take 1 tablet (10 mg) by oral route once daily for  90 days   colchicine 0.6 mg oral tablet 11/13/2019 take 2 tablets (1.2 mg) by oral route once daily for 90 days   cyclobenzaprine 10 mg oral tablet 12/16/2020 take 1 tablet by oral route 2 times a day as needed for 30 days   diclofenac sodium 50 mg oral tablet,delayed release (DR/EC) 03/09/2020 take 1 tablet (50 mg) by oral route 2 times per day   duloxetine 20 mg oral capsule,delayed release(DR/EC) 02/24/2020 take 1 capsule by oral route daily   duloxetine 40 mg oral capsule,delayed release(DR/EC) 03/04/2020 TAKE 1 CAPSULE(40 MG) BY MOUTH EVERY DAY   febuxostat 80 mg oral tablet  take 1 tablet (80 mg) by oral route once daily for gout   gabapentin 600 mg oral tablet 10/29/2019 take 1 1/2 tablet by oral route TID as needed   losartan 100 mg oral tablet  take 1 tablet (100 mg) by oral route once daily   melatonin 3 mg oral capsule  take 2 capsules by oral route at bedtime   METOPROLOL ER SUCCINATE 50MG TABS 11/10/2020 TAKE 1 TABLET BY MOUTH DAILY   metoprolol succinate 25 mg oral tablet extended release 24 hr 07/13/2020 TAKE 1 TABLET BY MOUTH EVERY DAY   rizatriptan 10 mg oral tablet  take 1 tablet (10 mg) by oral route once, may repeat at 2 hour intervals; do not exceed 30 mg in 24 hours   rosuvastatin 20 mg oral tablet 02/25/2020 take 1 tablet by oral route once a day (at bedtime)   spironolactone 25 mg oral tablet  take 1 tablet (25 mg) by oral route once daily   tadalafil 5 mg oral tablet  take 2 tablets (10 mg) by oral route as needed approximately 1 hour before sexual activity   testosterone cypionate 200 mg/mL intramuscular oil 04/08/2020 inject 0.5 milliliter by intramuscular route every 2 weeks   Toprol XL 50 mg oral tablet extended release 24 hr 02/25/2020 take 1 tablet (50 mg) by oral route once daily   Vitamin D2 50,000 unit oral capsule 02/25/2020 take 1 capsule by oral route once a week for 90 days         Allergy List  Allergen Name Date Reaction Notes   ibuprofen --  --  --    PENICILLINS --  --  --   "  Viagra --  --  --        Allergies Reconciled  Family Medical History  Disease Name Relative/Age Notes   *Non Contributory  --          Social History  Finding Status Start/Stop Quantity Notes   Tobacco Never --/-- --  --          Immunizations  NameDate Admin Mfg Trade Name Lot Number Route Inj VIS Given VIS Publication   InfluenzaRefused 10/29/2019 NE Not Entered  NE NE     Comments:          Review of Systems  · Constitutional  o Denies  o : fatigue, fever  · Eyes  o Denies  o : discharge from eye, redness  · HENT  o Denies  o : headaches, congestion  · Cardiovascular  o Denies  o : chest pain, palpitations  · Respiratory  o Denies  o : shortness of breath, wheezing, cough  · Gastrointestinal  o Denies  o : vomiting, diarrhea, constipation  · Genitourinary  o Denies  o : hematuria/dysuria  · Integument  o Denies  o : rash, new skin lesions  · Neurologic  o Denies  o : altered mental status, seizures  · Musculoskeletal  o Admits  o : back pain  o Denies  o : weakness, joint swelling  · Psychiatric  o Denies  o : anxiety, depression  · Heme-Lymph  o Denies  o : lymph node enlargement, tenderness      Vitals  Date Time BP Position Site L\R Cuff Size HR RR TEMP (F) WT  HT  BMI kg/m2 BSA m2 O2 Sat FR L/min FiO2        12/16/2020 01:28 /98 Sitting    89 - R 16 97.2 260lbs 4oz 5'  10\" 37.34 2.41 92 %  21%          Physical Examination  · Constitutional  o Appearance  o : alert, in no acute distress, well developed, well-nourished  · Head and Face  o Head  o : normocephalic, atraumatic, non tender, no palpable masses or nodules.  o Face  o : no facial lesions  · Eyes  o Vision  o : Acuity: grossly normal at distance, Conjuntivae: Normal, Sclerae white, Pupils: PERRL, Cornea: Clear, no lesions bilateral  · Neck  o Inspection/Palpation  o : Supple, no masses or tenderness, no deformities, Trachea: Midline, ROM: with in normal limits, no neck stiffness  o Thyroid  o : no thyomegaly, no palpabale masses "   · Respiratory  o Auscultation of Lungs  o : normal breath sounds throughout  · Cardiovascular  o Heart  o : Regular rate and rhythm, Normal S1,S2 , No cardiac murmers, No S3 or S4 gallop or rubs  · Gastrointestinal  o Abdominal Examination  o : abdomen soft, nontender, non distended, no rigidity, gaurding, rebound tenderness, no ventral or inguinal hernias present  o Liver and spleen  o : no hepatomegaly present, liver nontender to palpation, spleen not palpable  · Musculoskeletal  o General  o :   § General Musculoskeletal  § : No joint swelling or deformity., Muscle tone, strength, and development grossly normal. Back exam; no tenderness, no CVA tenderness noted, bilateral lower extremity knee reflexes are intact  · Skin and Subcutaneous Tissue  o General Inspection  o : no rashes , or lesions present, normal skin color, warm and dry  o Digits and Nails  o : no clubbing, cyanosis, deformities or edema present, normal appearing nails  · Neurologic  o Mental Status Examination  o : alert and oriented to time, place, and person., Cranial Nerves: grossly intact,   · Psychiatric  o Mood and Affect  o : normal mood and affect          Results  · In-Office Procedures  o Lab procedure  § IOP - Urinalysis without Microscopy (Clinitek) [facility] (00965)   § Color Ur: Yellow   § Clarity Ur: Clear   § Glucose Ur Ql Strip: Negative   § Bilirub Ur Ql Strip: Negative   § Ketones Ur Ql Strip: Negative   § Sp Gr Ur Qn: 1.010   § Hgb Ur Ql Strip: Negative   § pH Ur-LsCnc: 7.0   § Prot Ur Ql Strip: Negative   § Urobilinogen Ur Strip-mCnc: 0.2 E.U./dL   § Nitrite Ur Ql Strip: Negative   § WBC Est Ur Ql Strip: Negative       Assessment  · Back pain     724.5/M54.9  --Due to patient's history it looks like this pain is because of his history of disc herniation and back pain  --No UTI, kidney stone was considered but with mild symptoms patient was advised to monitor it and report if no improvement or worsening  · Kidney  stone     592.0/N20.0  Patient reports history of kidney stones, urine dip was done without any leuk/nitrate or RBCs.  --Patient was instructed to call the clinic if worsening pain to rule out kidney stones      Plan  · Orders  o ACO-39: Current medications updated and reviewed (1159F, ) - - 12/16/2020  o ACO-14: Influenza immunization was not administered for reasons documented [facility] () - - 12/16/2020  · Medications  o cyclobenzaprine 10 mg oral tablet   SIG: take 1 tablet by oral route 2 times a day as needed for 30 days   DISP: (60) Tablet with 0 refills  Refilled on 12/16/2020     o metoprolol succinate 50 mg oral tablet extended release 24 hr   SIG: TAKE 1 TABLET BY MOUTH DAILY   DISP: (90) Tablet with -1 refills  Discontinued on 12/16/2020     o Medications have been Reconciled  o Transition of Care or Provider Policy  · Instructions  o Patient was educated/instructed on their diagnosis, treatment and medications prior to discharge from the clinic today.  o Patient counseled to reduce calorie intake.  o Patient was instructed to exercise regularly.  o Patient instructed to seek medical attention urgently for new or worsening symptoms.  o Call the office with any concerns or questions.  o Bring all medicines with their bottles to each office visit.  o Minutes spent with patient including greater than 50% in Education/Counseling/Care Coordination.  o Time spent with the patient was minutes, more than 50% face to face.  o Discussed Covid-19 precautions including, but not limited to, social distancing, avoid touching your face, and hand washing.   · Disposition  o Call or Return if symptoms worsen or persist.  o Follow Up PRN.  o Follow Up in 1 month.            Electronically Signed by: Collin Quiros MD -Author on December 16, 2020 03:37:13 PM

## 2021-05-14 NOTE — PROGRESS NOTES
Progress Note      Patient Name: Sameer Méndez   Patient ID: [id number]   Sex: Male   YOB: 1975    Primary Care Provider: Tre VALIENTE   Referring Provider: Tre VALIENTE    Visit Date: April 28, 2021    Provider: STEFFANIE Schmitt   Location: [facility]   Location Address: [address] [place] [address], [address] [place] [address]  [id number]   Location Phone: [phone]          Chief Complaint  · Annual Wellness Exam      History Of Present Illness  The patient is a 46 year old /Black male who has come to this office for his Annual Wellness Visit.   His Primary Care Provider is Tre VALIENTE. His comprehensive Care Team list, including suppliers, has been updated on the Facesheet. His medical/family history, height, weight, BMI, and blood pressure have been reviewed and are in the chart. The Health Risk Assessment has been completed and scanned in the chart.   Medications are listed in the medication list.   The active problem list includes: Degeneration of lumbar intervertebral disc, Foot pain, Gout, Hypertension, Knee Pain, Lumbago/low back pain, Lumbosacral disc herniation, L5-S1, and Shoulder pain   The patient does not have a history of substance use.   Patient reports his diet is adequate.   The Mini-Cog has been administered and is scanned in chart. The results are negative. His cognitive function is without limitation.   A hearing loss screen was completed today and the result is positive, indicating a need for further evaluation.   Patient does not have any risk factors for depression. Patient completed the PHQ-9 today and it has been scanned in the chart. The total score is 5-9.   The Timed Up and Go screen was administered today and the result is negative.   The [place] Index of [place] in ADLs indicated full function (score of 6).   A Falls Risk Assessment has been completed, including a review of home fall hazards and medication review.    Overall, the patient's functional ability is noted by this provider to be within normal limits. His level of safety is noted to be within normal limits. His balance/gait is within normal limits. There have been two or more falls in the past year. Patient-specific home safety recommendations have been reviewed and a copy has been given to patient.   He denies issues with leaking urine.   There are no additional risk factors identified.   Living Will/Advanced Directive previously executed but not in chart.   Personalized health advice was given to the patient and a written health screening schedule was established; see Plan for details.   Sameer Méndez is a 46 year old /Black male who presents for evaluation and treatment of:       Past Medical History  Disease Name Date Onset Notes   Degeneration of lumbar intervertebral disc 09/24/2013 --    Foot pain --  --    Gout --  --    Hypertension --  --    Knee Pain --  --    Lumbago/low back pain 09/24/2013 --    Lumbosacral disc herniation, L5-S1 09/24/2013 MRI of the Lumbar Spine was reviewed which shows multi-level DDD, most significant at L5-S1 with mild buldging at L4-5 and L5-S1. No evidence of any disc herniations or significant nerve root compression.   Shoulder pain --  --          Past Surgical History  Procedure Name Date Notes   *No Past Surgical History --  --          Medication List  Name Date Started Instructions   amlodipine 10 mg oral tablet 10/29/2019 take 1 tablet (10 mg) by oral route once daily for 90 days   colchicine 0.6 mg oral tablet 11/13/2019 take 2 tablets (1.2 mg) by oral route once daily for 90 days   cyclobenzaprine 10 mg oral tablet 12/16/2020 take 1 tablet by oral route 2 times a day as needed for 30 days   diclofenac sodium 50 mg oral tablet,delayed release (DR/EC) 03/09/2020 take 1 tablet (50 mg) by oral route 2 times per day   febuxostat 80 mg oral tablet  take 1 tablet (80 mg) by oral route once daily for gout    gabapentin 600 mg oral tablet 10/29/2019 take 1 1/2 tablet by oral route TID as needed   Linzess 145 mcg oral capsule 01/29/2021 take 1 capsule (145 mcg) by oral route once daily on an empty stomach at least 30 minutes before 1st meal of the day   losartan 100 mg oral tablet  take 1 tablet (100 mg) by oral route once daily   melatonin 3 mg oral capsule  take 2 capsules by oral route at bedtime   METOPROLOL ER SUCCINATE 25MG TABS 03/29/2021 TAKE 1 TABLET BY MOUTH EVERY DAY   prednisone 50 mg oral tablet 01/29/2021 take 1 tablet (50 mg) by oral route once daily for 5 days   rizatriptan 10 mg oral tablet  take 1 tablet (10 mg) by oral route once, may repeat at 2 hour intervals; do not exceed 30 mg in 24 hours   rosuvastatin 20 mg oral tablet 03/12/2021 take 1 tablet by oral route once a day (at bedtime)   tadalafil 5 mg oral tablet  take 2 tablets (10 mg) by oral route as needed approximately 1 hour before sexual activity   testosterone cypionate 200 mg/mL intramuscular oil 03/31/2021 inject 0.5 milliliter by intramuscular route every 2 weeks   Toprol  mg oral tablet extended release 24 hr 03/09/2021 take 1 tablet (100 mg) by oral route once daily   Valium 5 mg oral tablet 03/04/2021 one po 30 minutes prior to MRI   Vitamin D2 50,000 unit oral capsule 02/25/2020 take 1 capsule by oral route once a week for 90 days   Zithromax Z-Ismael 250 mg oral tablet 03/17/2021 take 2 tablets (500 mg) by oral route once daily for 1 day then 1 tablet (250 mg) by oral route once daily for 4 days         Allergy List  Allergen Name Date Reaction Notes   ibuprofen --  --  --    Motrin --  --  --    PENICILLINS --  --  --    Viagra --  --  --          Family Medical History  Disease Name Relative/Age Notes   *Non Contributory  --          Social History  Finding Status Start/Stop Quantity Notes   Tobacco Never --/-- --  --          Immunizations  NameDate Admin Mfg Trade Name Lot Number Route Inj VIS Given VIS Publication  "  InfluenzaRefused 01/29/2021 NE Not Entered  NE NE     Comments:          Review of Systems  · Constitutional  o Denies  o : fatigue, fever, weight gain, weight loss, chills  · Eyes  o Denies  o : changes in vision  · HENT  o Denies  o : ear pain, sore throat  · Cardiovascular  o Denies  o : chest Pain, palpitations, edema (swelling)  · Respiratory  o Denies  o : frequent cough, shortness of breath  · Gastrointestinal  o Denies  o : nausea, vomiting, changes in bowel habits  · Genitourinary  o Denies  o : dysuria, urinary frequency, urinary urgency, polyuria  · Integument  o Denies  o : rash  · Neurologic  o Denies  o : headache, tingling or numbness, dizziness  · Musculoskeletal  o Denies  o : joint pain, myalgias  · Endocrine  o Denies  o : polydipsia, polyphagia  · Psychiatric  o Denies  o : mood changes, memory changes, SI/HI  · Heme-Lymph  o Denies  o : easy bruising, easy bleeding, lymph node enlargement or tenderness  · Allergic-Immunologic  o Denies  o : eczema, seasonal allergies, urticaria      Vitals  Date Time BP Position Site L\R Cuff Size HR RR TEMP (F) WT  HT  BMI kg/m2 BSA m2 O2 Sat FR L/min FiO2 HC       04/28/2021 03:32 /96 Sitting    69 - R  98.6 257lbs 0oz 5'  10\" 36.88 2.4 96 %  21%              Assessment  · Encounter for Medicare annual wellness exam     V70.0/Z00.00  · Screening for depression     V79.0/Z13.89  · Screening for alcoholism     V79.1/Z13.39  · Advanced care planning/counseling discussion     V65.49/Z71.89  · Need for hepatitis C screening test     V73.89/Z11.59  · Obesity     278.00/E66.9      Plan  · Orders  o Falls Risk Assessment Completed (3288F) - V70.0/Z00.00 - 04/28/2021  o Brief hearing screening (written) [facility] () - V70.0/Z00.00 - 04/28/2021  o Annual Wellness Visit-includes a Personalized Prevention Plan of Service (PPS), SUBSEQUENT VISIT (Medicare) () - V70.0/Z00.00 - 04/28/2021  o ACO-13: Fall Risk Screening with 2 or more falls in past year or any " fall with injury in the past year (1100F) - V70.0/Z00.00 - 04/28/2021  o Presence or absence of urinary incontinence assessed (PHAM) (1090F) - V70.0/Z00.00 - 04/28/2021  o Annual depression screening using the PHQ-9 tool, 15 minutes (, 35691) - V79.0/Z13.89 - 04/28/2021  o ACO-18: Positive screen for clinical depression using a standardized tool and a follow-up plan documented () - V79.0/Z13.89 - 04/28/2021  o Annual alcohol screening using the AUDIT-C tool, 15 minutes [facility] (71065, ) - V79.1/Z13.39 - 04/28/2021  o Negative alcohol screening () - V79.1/Z13.39 - 04/28/2021  o Advance care planning, 30 minutes [facility] (87449) - V65.49/Z71.89 - 04/28/2021  o ACO - Pt declines to or was not able to provide an Advance Care Plan or name a Surrogate Decision Maker (1124F) - V65.49/Z71.89 - 04/28/2021  o Hepatitis C antibody MEDICARE screening [facility] (46934, ) - - 04/28/2021  o Nutritional Consultation (NUTRI) - 278.00/E66.9 - 04/28/2021  o ACO-14: Influenza immunization was not administered for reasons documented [facility] () - - 04/28/2021  o ACO-39: Current medications updated and reviewed (, 1159F) - - 04/28/2021  o ACO-18: Positive screen for clinical depression using a standardized tool and a follow-up plan documented () - - 04/28/2021  o ACO-13: Fall Risk Screening with 2 or more falls in past year or any fall with injury in the past year (1100F) - - 04/28/2021  o ACO - Pt declines to or was not able to provide an Advance Care Plan or name a Surrogate Decision Maker (1124F) - - 04/28/2021  · Medications  o Medications have been Reconciled  o Transition of Care or Provider Policy  · Instructions  o Health Risk Assessment has been reviewed with the patient.  o Written health screening schedule for next 5-10 years was established with patient; information scanned in chart and given/mailed to patient.  o Fall prevention methods discussed and a copy of recommendations given/mailed to  patient.  o Today's PHQ-9 score is: 8  o Positive depression screen. Today's Plan: Patient states that he is going to discuss this with his counselor. No SI/HI  o Depression Screen completed and scanned into the EMR under the designated folder within the patient's documents.  o Audit-C Questionnaire completed and scanned into the EMR under the designated folder within the patient's documents.  o Audit-C score of 0-4 - Negative Screen - Brief Discussion  o Medicare suggests a once in a lifetime screening for Hepatitis C for all Medicare beneficiaries born between 6422-3748.  o Patient was educated/instructed on their diagnosis, treatment and medications prior to discharge from the clinic today.            Electronically Signed by: STEFFANIE Schmitt -Author on April 28, 2021 04:36:40 PM

## 2021-05-15 VITALS
TEMPERATURE: 98.9 F | RESPIRATION RATE: 16 BRPM | WEIGHT: 254 LBS | BODY MASS INDEX: 36.36 KG/M2 | OXYGEN SATURATION: 98 % | HEART RATE: 69 BPM | SYSTOLIC BLOOD PRESSURE: 134 MMHG | DIASTOLIC BLOOD PRESSURE: 96 MMHG | HEIGHT: 70 IN

## 2021-05-15 VITALS
SYSTOLIC BLOOD PRESSURE: 140 MMHG | HEART RATE: 98 BPM | BODY MASS INDEX: 36.22 KG/M2 | WEIGHT: 253 LBS | TEMPERATURE: 97.3 F | DIASTOLIC BLOOD PRESSURE: 92 MMHG | OXYGEN SATURATION: 95 % | HEIGHT: 70 IN | RESPIRATION RATE: 16 BRPM

## 2021-05-15 VITALS
RESPIRATION RATE: 18 BRPM | OXYGEN SATURATION: 99 % | TEMPERATURE: 98.5 F | DIASTOLIC BLOOD PRESSURE: 82 MMHG | HEIGHT: 70 IN | WEIGHT: 252 LBS | BODY MASS INDEX: 36.08 KG/M2 | SYSTOLIC BLOOD PRESSURE: 142 MMHG | HEART RATE: 80 BPM

## 2021-05-15 VITALS
BODY MASS INDEX: 37.09 KG/M2 | HEART RATE: 87 BPM | WEIGHT: 259.12 LBS | SYSTOLIC BLOOD PRESSURE: 140 MMHG | DIASTOLIC BLOOD PRESSURE: 82 MMHG | HEIGHT: 70 IN | TEMPERATURE: 98 F | RESPIRATION RATE: 18 BRPM | OXYGEN SATURATION: 96 %

## 2021-05-23 ENCOUNTER — TRANSCRIBE ORDERS (OUTPATIENT)
Dept: ADMINISTRATIVE | Facility: HOSPITAL | Age: 46
End: 2021-05-23

## 2021-05-23 DIAGNOSIS — E66.01 CLASS 2 SEVERE OBESITY DUE TO EXCESS CALORIES WITH SERIOUS COMORBIDITY IN ADULT, UNSPECIFIED BMI (HCC): Primary | ICD-10-CM

## 2021-06-05 NOTE — PROGRESS NOTES
Progress Note      Patient Name: Sameer Méndez   Patient ID: [id number]   Sex: Male   YOB: 1975    Primary Care Provider: Tre VALIENTE   Referring Provider: Tre VALIENTE    Visit Date: May 12, 2021    Provider: Roya Kruger PA-C   Location: [facility]   Location Address: [address] [place] [address]  [id number]   Location Phone: [phone]          Chief Complaint  · Follow-up     Here to discuss MRI results at [place], did not bring disc.       History Of Present Illness  The patient is a 46 year old /Black male who is in the office for followup appointment.      MRI lumbar spine showed no significant changes from 2013.  He has multilevel degenerative disc disease.  These were the most notable findings. He has constant low back pain and stiffness. Some pain into the right heel and left anterior thigh paresthesias.       Past Medical History  Degeneration of lumbar intervertebral disc; Foot pain; Gout; Hypertension; Knee Pain; Lumbago/low back pain; Lumbago/low back pain; Lumbosacral disc herniation, L5-S1; Shoulder pain         Past Surgical History  *No Past Surgical History         Medication List  amlodipine 10 mg oral tablet; colchicine 0.6 mg oral tablet; cyclobenzaprine 10 mg oral tablet; diclofenac sodium 50 mg oral tablet,delayed release (DR/EC); febuxostat 80 mg oral tablet; gabapentin 600 mg oral tablet; Linzess 145 mcg oral capsule; losartan 100 mg oral tablet; melatonin 3 mg oral capsule; METOPROLOL ER SUCCINATE 50MG TABS; rizatriptan 10 mg oral tablet; rosuvastatin 20 mg oral tablet; tadalafil 5 mg oral tablet; testosterone cypionate 200 mg/mL intramuscular oil; Toprol  mg oral tablet extended release 24 hr; Vitamin D2 50,000 unit oral capsule         Allergy List  ibuprofen; Motrin; PENICILLINS; Viagra       Allergies Reconciled  Family Medical History  *Non Contributory         Social History  Tobacco  "(Never)         Immunizations  Name Date Admin   Influenza Refused   Influenza Refused         Review of Systems  · Constitutional  o Admits  o : weight gain  o Denies  o : chills, excessive sweating, fatigue, fever, sycope/passing out, weight loss  · Eyes  o Denies  o : changes in vision, blurry vision, double vision  · HENT  o Admits  o : ringing in the ears, nasal congestion, nose bleeds, seasonal allergies  o Denies  o : loss of hearing, ear aches, sore throat, sinus pain  · Cardiovascular  o Denies  o : blood clots, swollen legs, anemia, easy burising or bleeding, transfusions  · Respiratory  o Denies  o : shortness of breath, dry cough, productive cough, pneumonia, COPD  · Gastrointestinal  o Denies  o : difficulty swallowing, reflux  · Genitourinary  o Admits  o : erectile dysfunction  o Denies  o : incontinence  · Neurologic  o Admits  o : falls, difficulty with sleep  o Denies  o : headache, seizure, stroke, tremor, loss of balance, dizziness/vertigo, numbness/tingling/paresthesia , difficulty with coordination, difficulty with dexterity, weakness  · Musculoskeletal  o Admits  o : neck stiffness/pain, joint pain, spasms, sciatica, pain radiating in arm, pain radiating in leg, low back pain  o Denies  o : swollen lymph nodes, muscle aches, weakness  · Endocrine  o Denies  o : diabetes, thyroid disorder  · Psychiatric  o Admits  o : anxiety, depression  · All Others Negative      Vitals  Date Time BP Position Site L\R Cuff Size HR RR TEMP (F) WT  HT  BMI kg/m2 BSA m2 O2 Sat FR L/min FiO2        05/12/2021 08:14 AM        97.5 257lbs 1oz 5'  10\" 36.88 2.4             Physical Examination  · Constitutional  o Appearance  o : well-nourished, well developed, alert, in no acute distress  · Respiratory  o Respiratory Effort  o : breathing unlabored  · Cardiovascular  o Peripheral Vascular System  o :   § Extremities  § : no edema or cyanosis  · Musculoskeletal  o Spine  o :   § Inspection/Palpation  § : no spinal " tenderness or misalignment  o Right Lower Extremity  o :   § Inspection/Palpation  § : no joint or limb tenderness to palpation, no edema present, no ecchymosis  § Joint Stability  § : joint stability within normal limits  § Range of Motion  § : range of motion normal, no joint crepitations present, no pain on motion, Guido's test negative  o Left Lower Extremity  o :   § Inspection/Palpation  § : no joint or limb tenderness to palpation, no edema present, no ecchymosis  § Joint Stability  § : joint stability within normal limits  § Range of Motion  § : range of motion normal, no joint crepitations present, no pain on motion, Guido's test negative  · Skin and Subcutaneous Tissue  o Extremities  o :   § Right Lower Extremity  § : no lesions or areas of discoloration  § Left Lower Extremity  § : no lesions or areas of discoloration  o Back  o : no lesions or areas of discoloration  · Neurologic  o Mental Status Examination  o :   § Orientation  § : alert and oriented to time, person, place and events  o Motor Examination  o :   § RLE Strength  § : strength normal  § RLE Motor Function  § : tone normal, no atrophy, no abnormal movements noted  § LLE Strength  § : strength normal  § LLE Motor Function  § : tone normal, no atrophy, no abnormal movements noted  o Reflexes  o :   § RLE  § : knee and ankle reflexes 1/4, SLR negative  § LLE  § : knee and ankle reflexes 1/4, SLR negative  o Sensation  o :   § Light Touch  § : sensation intact to light touch in extremities  o Gait and Station  o :   § Gait Screening  § : using a cane  · Psychiatric  o Mood and Affect  o : mood normal, affect appropriate          Assessment  · Degeneration of lumbar intervertebral disc     722.52/M51.36  · Lumbago/low back pain     724.3/M54.40  · Paresthesia     782.0/R20.2  · Radiculopathy, lumbosacral     724.4/M54.16      Plan  · Orders  o PAIN MANAGEMENT CONSULTATION (PAINM) - 722.52/M51.36, 724.3/M54.40, 782.0/R20.2, 724.4/M54.16 -  05/12/2021   new consult for LESB   and discuss lyrica  · Medications  o Medications have been Reconciled  o Transition of Care or Provider Policy  · Instructions  o The ROS and the PFSH were reviewed at today's visit.  o Call or return to office if symptoms worsen or persist.   o I will refer him to pain management for LESB. Lumbar surgery is not recommended for his ddd. He will discuss potentially switching his gabapentin to lyrica at his pain management appt. He will work on core/back strengthening.             Electronically Signed by: Roya Kruger PA-C -Author on May 12, 2021 08:35:06 AM

## 2021-07-15 VITALS — TEMPERATURE: 97.5 F | WEIGHT: 257.06 LBS | BODY MASS INDEX: 36.8 KG/M2 | HEIGHT: 70 IN

## 2021-07-28 RX ORDER — LINACLOTIDE 145 UG/1
CAPSULE, GELATIN COATED ORAL
Qty: 90 CAPSULE | Refills: 0 | Status: SHIPPED | OUTPATIENT
Start: 2021-07-28 | End: 2021-11-12

## 2021-09-16 ENCOUNTER — HOSPITAL ENCOUNTER (EMERGENCY)
Facility: HOSPITAL | Age: 46
Discharge: HOME | End: 2021-09-16
Attending: EMERGENCY MEDICINE | Admitting: EMERGENCY MEDICINE

## 2021-09-16 VITALS
WEIGHT: 245.15 LBS | OXYGEN SATURATION: 96 % | DIASTOLIC BLOOD PRESSURE: 101 MMHG | TEMPERATURE: 100.5 F | HEIGHT: 70 IN | RESPIRATION RATE: 20 BRPM | BODY MASS INDEX: 35.1 KG/M2 | SYSTOLIC BLOOD PRESSURE: 143 MMHG | HEART RATE: 91 BPM

## 2021-09-16 DIAGNOSIS — U07.1 GASTROENTERITIS DUE TO COVID-19 VIRUS: Primary | ICD-10-CM

## 2021-09-16 DIAGNOSIS — A08.39 GASTROENTERITIS DUE TO COVID-19 VIRUS: Primary | ICD-10-CM

## 2021-09-16 LAB
ALBUMIN SERPL-MCNC: 4.5 G/DL (ref 3.5–5.2)
ALBUMIN/GLOB SERPL: 1.6 G/DL
ALP SERPL-CCNC: 46 U/L (ref 39–117)
ALT SERPL W P-5'-P-CCNC: 33 U/L (ref 1–41)
ANION GAP SERPL CALCULATED.3IONS-SCNC: 13.8 MMOL/L (ref 5–15)
AST SERPL-CCNC: 56 U/L (ref 1–40)
BASOPHILS # BLD AUTO: 0.01 10*3/MM3 (ref 0–0.2)
BASOPHILS NFR BLD AUTO: 0.2 % (ref 0–1.5)
BILIRUB SERPL-MCNC: 0.9 MG/DL (ref 0–1.2)
BUN SERPL-MCNC: 18 MG/DL (ref 6–20)
BUN/CREAT SERPL: 10.7 (ref 7–25)
CALCIUM SPEC-SCNC: 8.8 MG/DL (ref 8.6–10.5)
CHLORIDE SERPL-SCNC: 102 MMOL/L (ref 98–107)
CO2 SERPL-SCNC: 22.2 MMOL/L (ref 22–29)
CREAT SERPL-MCNC: 1.68 MG/DL (ref 0.76–1.27)
DEPRECATED RDW RBC AUTO: 43.2 FL (ref 37–54)
EOSINOPHIL # BLD AUTO: 0 10*3/MM3 (ref 0–0.4)
EOSINOPHIL NFR BLD AUTO: 0 % (ref 0.3–6.2)
ERYTHROCYTE [DISTWIDTH] IN BLOOD BY AUTOMATED COUNT: 13.5 % (ref 12.3–15.4)
GFR SERPL CREATININE-BSD FRML MDRD: 53 ML/MIN/1.73
GLOBULIN UR ELPH-MCNC: 2.8 GM/DL
GLUCOSE SERPL-MCNC: 105 MG/DL (ref 65–99)
HCT VFR BLD AUTO: 39.4 % (ref 37.5–51)
HGB BLD-MCNC: 13.5 G/DL (ref 13–17.7)
HOLD SPECIMEN: NORMAL
HOLD SPECIMEN: NORMAL
IMM GRANULOCYTES # BLD AUTO: 0.01 10*3/MM3 (ref 0–0.05)
IMM GRANULOCYTES NFR BLD AUTO: 0.2 % (ref 0–0.5)
LYMPHOCYTES # BLD AUTO: 1.24 10*3/MM3 (ref 0.7–3.1)
LYMPHOCYTES NFR BLD AUTO: 22.9 % (ref 19.6–45.3)
MAGNESIUM SERPL-MCNC: 1.9 MG/DL (ref 1.6–2.6)
MCH RBC QN AUTO: 29.9 PG (ref 26.6–33)
MCHC RBC AUTO-ENTMCNC: 34.3 G/DL (ref 31.5–35.7)
MCV RBC AUTO: 87.2 FL (ref 79–97)
MONOCYTES # BLD AUTO: 0.37 10*3/MM3 (ref 0.1–0.9)
MONOCYTES NFR BLD AUTO: 6.8 % (ref 5–12)
NEUTROPHILS NFR BLD AUTO: 3.79 10*3/MM3 (ref 1.7–7)
NEUTROPHILS NFR BLD AUTO: 69.9 % (ref 42.7–76)
NRBC BLD AUTO-RTO: 0 /100 WBC (ref 0–0.2)
PLATELET # BLD AUTO: 166 10*3/MM3 (ref 140–450)
PMV BLD AUTO: 10.2 FL (ref 6–12)
POTASSIUM SERPL-SCNC: 3.6 MMOL/L (ref 3.5–5.2)
PROT SERPL-MCNC: 7.3 G/DL (ref 6–8.5)
QT INTERVAL: 351 MS
RBC # BLD AUTO: 4.52 10*6/MM3 (ref 4.14–5.8)
SODIUM SERPL-SCNC: 138 MMOL/L (ref 136–145)
TROPONIN T SERPL-MCNC: <0.01 NG/ML (ref 0–0.03)
WBC # BLD AUTO: 5.42 10*3/MM3 (ref 3.4–10.8)
WHOLE BLOOD HOLD SPECIMEN: NORMAL
WHOLE BLOOD HOLD SPECIMEN: NORMAL

## 2021-09-16 PROCEDURE — 85025 COMPLETE CBC W/AUTO DIFF WBC: CPT | Performed by: EMERGENCY MEDICINE

## 2021-09-16 PROCEDURE — 93010 ELECTROCARDIOGRAM REPORT: CPT | Performed by: INTERNAL MEDICINE

## 2021-09-16 PROCEDURE — 93005 ELECTROCARDIOGRAM TRACING: CPT

## 2021-09-16 PROCEDURE — 84484 ASSAY OF TROPONIN QUANT: CPT | Performed by: EMERGENCY MEDICINE

## 2021-09-16 PROCEDURE — 80053 COMPREHEN METABOLIC PANEL: CPT | Performed by: EMERGENCY MEDICINE

## 2021-09-16 PROCEDURE — 83735 ASSAY OF MAGNESIUM: CPT | Performed by: EMERGENCY MEDICINE

## 2021-09-16 PROCEDURE — 99283 EMERGENCY DEPT VISIT LOW MDM: CPT

## 2021-09-16 PROCEDURE — 93005 ELECTROCARDIOGRAM TRACING: CPT | Performed by: EMERGENCY MEDICINE

## 2021-09-16 RX ORDER — SODIUM CHLORIDE 0.9 % (FLUSH) 0.9 %
10 SYRINGE (ML) INJECTION AS NEEDED
Status: DISCONTINUED | OUTPATIENT
Start: 2021-09-16 | End: 2021-09-16 | Stop reason: HOSPADM

## 2021-09-16 RX ORDER — ONDANSETRON 4 MG/1
4 TABLET, ORALLY DISINTEGRATING ORAL EVERY 8 HOURS PRN
Qty: 15 TABLET | Refills: 0 | Status: SHIPPED | OUTPATIENT
Start: 2021-09-16

## 2021-09-16 RX ORDER — ACETAMINOPHEN 325 MG/1
650 TABLET ORAL ONCE
Status: DISCONTINUED | OUTPATIENT
Start: 2021-09-16 | End: 2021-09-16 | Stop reason: HOSPADM

## 2021-09-16 RX ORDER — ACETAMINOPHEN 500 MG
500 TABLET ORAL ONCE
Status: COMPLETED | OUTPATIENT
Start: 2021-09-16 | End: 2021-09-16

## 2021-09-16 RX ORDER — PREDNISONE 50 MG/1
50 TABLET ORAL DAILY
Qty: 5 TABLET | Refills: 0 | Status: SHIPPED | OUTPATIENT
Start: 2021-09-16 | End: 2021-12-28

## 2021-09-16 RX ORDER — DICYCLOMINE HCL 20 MG
20 TABLET ORAL EVERY 6 HOURS
Qty: 20 TABLET | Refills: 0 | Status: SHIPPED | OUTPATIENT
Start: 2021-09-16 | End: 2021-12-28

## 2021-09-16 RX ADMIN — ACETAMINOPHEN 500 MG: 500 TABLET ORAL at 09:25

## 2021-09-16 RX ADMIN — SODIUM CHLORIDE 1000 ML: 9 INJECTION, SOLUTION INTRAVENOUS at 10:18

## 2021-09-16 NOTE — ED PROVIDER NOTES
Time: 09:56 EDT  Arrived by: private vehicle  Chief Complaint: syncope  History provided by: patient  History is limited by: N/A    History of Present Illness:  Patient is a 46 y.o. year old male that presents to the emergency department with SYNCOPE. Patient had one syncopal episode this morning. Episode occurred while having a bowel movement.     He denies any chest pain.     Patient tested positive for COVID two days ago. He states his symptoms began one week ago. He reports he has had a cough, fever, diarrhea and sweats.        Syncope  Episode history:  Single  Most recent episode:  Today  Chronicity:  New  Context: bowel movement    Associated symptoms: diaphoresis and fever    Associated symptoms: no chest pain, no headaches, no nausea, no palpitations, no seizures, no shortness of breath and no vomiting          Patient Care Team  Primary Care Provider: Tre Hdz APRN      Past Medical History:     Allergies   Allergen Reactions   • Ibuprofen GI Intolerance   • Penicillins Rash   • Viagra [Sildenafil] Other (See Comments)     BODYACHES     Past Medical History:   Diagnosis Date   • DDD (degenerative disc disease), thoracolumbar    • Gout    • Hyperlipidemia    • Hypertension      No past surgical history on file.  No family history on file.    Home Medications:  Prior to Admission medications    Medication Sig Start Date End Date Taking? Authorizing Provider   allopurinol (ZYLOPRIM) 100 MG tablet     Emergency, Nurse Anthony RN   amLODIPine (NORVASC) 10 MG tablet amlodipine Oral tablet 10 mg take 1 tablet (10 mg) by oral route once daily   Suspended    Emergency, Nurse Anthony, RN   Cholecalciferol 25 MCG (1000 UT) capsule Vitamin D3 1,000 unit oral capsule take 2 capsules by oral route daily   Suspended    Emergency, Nurse Epic, RN   dexamethasone (DECADRON) 6 MG tablet Take 1 tablet by mouth Daily With Breakfast for 5 days. 9/14/21 9/19/21  Juany Carranza APRN   febuxostat (ULORIC) 80 MG tablet tablet  febuxostat 80 mg oral tablet take 1 tablet (80 mg) by oral route once daily for gout   Active    Emergency, Nurse Epic, RN   gabapentin (NEURONTIN) 600 MG tablet gabapentin 600 mg oral tablet 1.5 tablets tid   Active    Emergency, Nurse Anthony RN   Linzess 145 MCG capsule capsule TAKE 1 CAPSULE(145 MCG) BY MOUTH EVERY DAY 30 MINUTES BEFORE FIRST MEAL OF THE DAY ON AN EMPTY STOMACH 7/28/21   Tre Hdz APRN   losartan (COZAAR) 100 MG tablet losartan 100 mg oral tablet take 1 tablet (100 mg) by oral route once daily   Active    Emergency, Nurse Anthony RN   melatonin 3-10 MG tablet tablet melatonin 3 mg oral capsule take 2 capsules by oral route at bedtime   Active    Emergency, Nurse Anthony RN   metoprolol succinate XL (TOPROL-XL) 50 MG 24 hr tablet Take 50 mg by mouth Daily. 8/11/21   Emergency, Nurse Epic, RN   oxyCODONE-acetaminophen (Percocet) 5-325 MG per tablet     Emergency, Nurse Anthony RN   Oxymetazoline HCl (Nasal Spray) 0.05 % solution     Emergency, Nurse Anthony RN   pravastatin (PRAVACHOL) 10 MG tablet     Emergency, Nurse Anthony RN   rosuvastatin (CRESTOR) 20 MG tablet Take 20 mg by mouth every night at bedtime. 6/10/21   Emergency, Nurse Cruz RN   Testosterone Cypionate (DEPOTESTOTERONE CYPIONATE) 200 MG/ML injection 0.5 mL. 5/12/21   Emergency, Nurse Anthony, RN        Social History:   PT  reports that he has never smoked. He has never used smokeless tobacco. He reports previous alcohol use. No history on file for drug use.    Record Review:  I have reviewed the patient's records in [facility].     Review of Systems  Review of Systems   Constitutional: Positive for diaphoresis and fever. Negative for chills.   HENT: Negative for congestion, rhinorrhea and sore throat.    Eyes: Negative for pain and visual disturbance.   Respiratory: Positive for cough. Negative for apnea, chest tightness and shortness of breath.    Cardiovascular: Positive for syncope. Negative for chest pain and palpitations.  "  Gastrointestinal: Positive for diarrhea. Negative for abdominal pain, nausea and vomiting.   Genitourinary: Negative for difficulty urinating and dysuria.   Musculoskeletal: Negative for joint swelling and myalgias.   Skin: Negative for color change.   Neurological: Negative for seizures and headaches.   Psychiatric/Behavioral: Negative.    All other systems reviewed and are negative.       Physical Exam  /87   Pulse 94   Temp 100.1 °F (37.8 °C) (Oral)   Resp 22   Ht 177.8 cm (70\")   Wt 111 kg (245 lb 2.4 oz)   SpO2 98%   BMI 35.18 kg/m²     Physical Exam  Vitals and nursing note reviewed.   Constitutional:       General: He is not in acute distress.     Appearance: Normal appearance. He is not toxic-appearing.   HENT:      Head: Normocephalic and atraumatic.      Jaw: There is normal jaw occlusion.   Eyes:      General: Lids are normal.      Extraocular Movements: Extraocular movements intact.      Conjunctiva/sclera: Conjunctivae normal.      Pupils: Pupils are equal, round, and reactive to light.   Cardiovascular:      Rate and Rhythm: Normal rate and regular rhythm.      Pulses: Normal pulses.      Heart sounds: Normal heart sounds.   Pulmonary:      Effort: Pulmonary effort is normal. No respiratory distress.      Breath sounds: Normal breath sounds. No wheezing or rhonchi.   Abdominal:      General: Abdomen is flat.      Palpations: Abdomen is soft.      Tenderness: There is no abdominal tenderness. There is no guarding or rebound.   Musculoskeletal:         General: Normal range of motion.      Cervical back: Normal range of motion and neck supple.      Right lower leg: No edema.      Left lower leg: No edema.   Skin:     General: Skin is warm and dry.   Neurological:      Mental Status: He is alert and oriented to person, place, and time. Mental status is at baseline.   Psychiatric:         Mood and Affect: Mood normal.                  ED Course  /87   Pulse 94   Temp 100.1 °F (37.8 " "°C) (Oral)   Resp 22   Ht 177.8 cm (70\")   Wt 111 kg (245 lb 2.4 oz)   SpO2 98%   BMI 35.18 kg/m²   Results for orders placed or performed during the hospital encounter of 09/16/21   Comprehensive Metabolic Panel    Specimen: Blood   Result Value Ref Range    Glucose 105 (H) 65 - 99 mg/dL    BUN 18 6 - 20 mg/dL    Creatinine 1.68 (H) 0.76 - 1.27 mg/dL    Sodium 138 136 - 145 mmol/L    Potassium 3.6 3.5 - 5.2 mmol/L    Chloride 102 98 - 107 mmol/L    CO2 22.2 22.0 - 29.0 mmol/L    Calcium 8.8 8.6 - 10.5 mg/dL    Total Protein 7.3 6.0 - 8.5 g/dL    Albumin 4.50 3.50 - 5.20 g/dL    ALT (SGPT) 33 1 - 41 U/L    AST (SGOT) 56 (H) 1 - 40 U/L    Alkaline Phosphatase 46 39 - 117 U/L    Total Bilirubin 0.9 0.0 - 1.2 mg/dL    eGFR  African Amer 53 (L) >60 mL/min/1.73    Globulin 2.8 gm/dL    A/G Ratio 1.6 g/dL    BUN/Creatinine Ratio 10.7 7.0 - 25.0    Anion Gap 13.8 5.0 - 15.0 mmol/L   Magnesium    Specimen: Blood   Result Value Ref Range    Magnesium 1.9 1.6 - 2.6 mg/dL   Troponin    Specimen: Blood   Result Value Ref Range    Troponin T <0.010 0.000 - 0.030 ng/mL   CBC Auto Differential    Specimen: Blood   Result Value Ref Range    WBC 5.42 3.40 - 10.80 10*3/mm3    RBC 4.52 4.14 - 5.80 10*6/mm3    Hemoglobin 13.5 13.0 - 17.7 g/dL    Hematocrit 39.4 37.5 - 51.0 %    MCV 87.2 79.0 - 97.0 fL    MCH 29.9 26.6 - 33.0 pg    MCHC 34.3 31.5 - 35.7 g/dL    RDW 13.5 12.3 - 15.4 %    RDW-SD 43.2 37.0 - 54.0 fl    MPV 10.2 6.0 - 12.0 fL    Platelets 166 140 - 450 10*3/mm3    Neutrophil % 69.9 42.7 - 76.0 %    Lymphocyte % 22.9 19.6 - 45.3 %    Monocyte % 6.8 5.0 - 12.0 %    Eosinophil % 0.0 (L) 0.3 - 6.2 %    Basophil % 0.2 0.0 - 1.5 %    Immature Grans % 0.2 0.0 - 0.5 %    Neutrophils, Absolute 3.79 1.70 - 7.00 10*3/mm3    Lymphocytes, Absolute 1.24 0.70 - 3.10 10*3/mm3    Monocytes, Absolute 0.37 0.10 - 0.90 10*3/mm3    Eosinophils, Absolute 0.00 0.00 - 0.40 10*3/mm3    Basophils, Absolute 0.01 0.00 - 0.20 10*3/mm3    Immature " Grans, Absolute 0.01 0.00 - 0.05 10*3/mm3    nRBC 0.0 0.0 - 0.2 /100 WBC   ECG 12 Lead   Result Value Ref Range    QT Interval 351 ms   Green Top (Gel)   Result Value Ref Range    Extra Tube Hold for add-ons.    Lavender Top   Result Value Ref Range    Extra Tube hold for add-on    Gold Top - SST   Result Value Ref Range    Extra Tube Hold for add-ons.    Light Blue Top   Result Value Ref Range    Extra Tube hold for add-on      Medications   sodium chloride 0.9 % flush 10 mL (has no administration in time range)   acetaminophen (TYLENOL) tablet 650 mg (0 mg Oral Hold 9/16/21 1013)   acetaminophen (TYLENOL) tablet 500 mg (500 mg Oral Given 9/16/21 0925)   sodium chloride 0.9 % bolus 1,000 mL (1,000 mL Intravenous New Bag 9/16/21 1018)     No results found.    Procedures/EKGs:  Procedures    EKG:    Rhythm: Sinus  Rate: 97  Axis: normal  Intervals: normal  ST Segment: No ST elevation      Interpreted by me        Medical Decision Making:                         MDM  Number of Diagnoses or Management Options  Gastroenteritis due to COVID-19 virus  Diagnosis management comments: The patient presents with vomiting and diarrhea consistent with gastroenteritis.  The patient´s CBC was reviewed and shows no abnormalities of critical concern. Of note, there is no anemia requiring a blood transfusion and the platelet count is acceptable.  The patient´s CMP was reviewed and shows no abnormalities of critical concern. Of note, the patient´s sodium and potassium are acceptable. The patient´s liver enzymes are unremarkable. The patient´s renal function (creatinine) is preserved. The patient has a normal anion gap.  Patient was given 1 L normal saline bolus emergency department.  The patient has a soft and benign abdominal exam. The patient is now resting comfortably and feels better, is alert, and is in no distress. The patient is able to tolerate po intake in the ED. The patient´s labs were reviewed and hydration status was  assessed. The patient has no signs of acute renal failure, sepsis, or dehydration that warrants admission to the hospital. The vital signs have been stable. The patient's condition is stable and appropriate for discharge. The patient will pursue further outpatient evaluation with the primary care physician or designated physician. The patient and/or caregivers have expressed a clear and thorough understanding and agreed to follow-up as instructed.       Amount and/or Complexity of Data Reviewed  Clinical lab tests: reviewed  Tests in the medicine section of CPT®: reviewed  Independent visualization of images, tracings, or specimens: yes    Risk of Complications, Morbidity, and/or Mortality  Presenting problems: moderate  Management options: moderate    Patient Progress  Patient progress: stable       Final diagnoses:   Gastroenteritis due to COVID-19 virus          Disposition:  ED Disposition     ED Disposition Condition Comment    Discharge Stable            Documentation assistance provided by Latoya Oneal acting as scribe for Dr. Moraima Patterson. Information recorded by the scribe was done at my direction and has been verified and validated by me.      Latoya Oneal  09/16/21 1009       Moraima Patterson MD  09/16/21 6223

## 2021-09-17 RX ORDER — ROSUVASTATIN CALCIUM 20 MG/1
TABLET, COATED ORAL
Qty: 90 TABLET | Refills: 0 | Status: SHIPPED | OUTPATIENT
Start: 2021-09-17 | End: 2021-12-13

## 2021-09-20 ENCOUNTER — TELEMEDICINE (OUTPATIENT)
Dept: FAMILY MEDICINE CLINIC | Facility: CLINIC | Age: 46
End: 2021-09-20

## 2021-09-20 DIAGNOSIS — R06.02 SHORTNESS OF BREATH: ICD-10-CM

## 2021-09-20 DIAGNOSIS — U07.1 COVID-19: Primary | ICD-10-CM

## 2021-09-20 PROCEDURE — 99213 OFFICE O/P EST LOW 20 MIN: CPT | Performed by: NURSE PRACTITIONER

## 2021-09-20 RX ORDER — ALBUTEROL SULFATE 90 UG/1
2 AEROSOL, METERED RESPIRATORY (INHALATION) EVERY 4 HOURS PRN
Qty: 8 G | Refills: 1 | Status: SHIPPED | OUTPATIENT
Start: 2021-09-20 | End: 2022-12-22

## 2021-09-20 RX ORDER — AZITHROMYCIN 250 MG/1
TABLET, FILM COATED ORAL
Qty: 6 TABLET | Refills: 0 | Status: SHIPPED | OUTPATIENT
Start: 2021-09-20 | End: 2021-12-28

## 2021-09-20 NOTE — PROGRESS NOTES
Chief Complaint  Covid-19 Home Monitoring Video Visit, Shortness of Breath, and Cough    Subjective          Sameer Méndez presents to [facility]  Patient presents via video visit for shortness of breath and cough.  Patient was seen at the urgent care on September 14 for COVID-19 symptoms.  He was tested and diagnosed with COVID-19.  Patient was placed on Decadron and sent home.  He went back to the ER on the 16th for syncopal episodes.  Patient was then placed on prednisone and dicyclomine.  Patient contacted the office today for a visit due to him having increased shortness of breath with exertion.  He states that becomes short of breath with ambulation.  I did explain to the patient that if his shortness of breath become more severe than he needs go the emergency department immediately.  I did discuss giving the patient albuterol as well as azithromycin to prevent any in [place] infection such as pneumonia.      Objective   Vital Signs:   There were no vitals taken for this visit.    Physical Exam   Result Review :                 Assessment and Plan    Diagnoses and all orders for this visit:    1. COVID-19 (Primary)  -     azithromycin (Zithromax Z-Ismael) 250 MG tablet; Take 2 tablets by mouth on day 1, then 1 tablet daily on days 2-5  Dispense: 6 tablet; Refill: 0  -     albuterol sulfate  (90 Base) MCG/ACT inhaler; Inhale 2 puffs Every 4 (Four) Hours As Needed for Wheezing.  Dispense: 8 g; Refill: 1    2. Shortness of breath  -     azithromycin (Zithromax Z-Ismael) 250 MG tablet; Take 2 tablets by mouth on day 1, then 1 tablet daily on days 2-5  Dispense: 6 tablet; Refill: 0  -     albuterol sulfate  (90 Base) MCG/ACT inhaler; Inhale 2 puffs Every 4 (Four) Hours As Needed for Wheezing.  Dispense: 8 g; Refill: 1        Follow Up   Return if symptoms worsen or fail to improve.  Patient was given instructions and counseling regarding his condition or for health  maintenance advice. Please see specific information pulled into the AVS if appropriate.

## 2021-09-28 RX ORDER — METOPROLOL SUCCINATE 25 MG/1
TABLET, EXTENDED RELEASE ORAL
Qty: 90 TABLET | Refills: 1 | Status: SHIPPED | OUTPATIENT
Start: 2021-09-28

## 2021-11-12 RX ORDER — LINACLOTIDE 145 UG/1
CAPSULE, GELATIN COATED ORAL
Qty: 90 CAPSULE | Refills: 0 | Status: SHIPPED | OUTPATIENT
Start: 2021-11-12 | End: 2022-02-14

## 2021-11-24 PROBLEM — M54.12 CERVICAL RADICULOPATHY: Status: ACTIVE | Noted: 2019-04-08

## 2021-11-24 PROBLEM — M25.519 SHOULDER PAIN: Status: ACTIVE | Noted: 2019-07-17

## 2021-11-24 PROBLEM — M79.673 FOOT PAIN: Status: ACTIVE | Noted: 2021-11-24

## 2021-11-24 PROBLEM — M47.812 CERVICAL SPONDYLOSIS: Status: ACTIVE | Noted: 2019-07-17

## 2021-11-24 PROBLEM — M10.9 GOUT: Status: ACTIVE | Noted: 2021-11-24

## 2021-11-24 PROBLEM — M47.816 LUMBAR SPONDYLOSIS: Status: ACTIVE | Noted: 2021-11-24

## 2021-11-24 PROBLEM — I10 HYPERTENSION: Status: ACTIVE | Noted: 2021-11-24

## 2021-11-24 PROBLEM — M50.30 DDD (DEGENERATIVE DISC DISEASE), CERVICAL: Status: ACTIVE | Noted: 2021-11-24

## 2021-11-24 PROBLEM — M54.2 NECK PAIN: Status: ACTIVE | Noted: 2019-04-08

## 2021-11-24 RX ORDER — RIZATRIPTAN BENZOATE 10 MG/1
10 TABLET ORAL ONCE AS NEEDED
COMMUNITY
End: 2022-12-22

## 2021-11-24 RX ORDER — LANOLIN ALCOHOL/MO/W.PET/CERES
CREAM (GRAM) TOPICAL
COMMUNITY
End: 2021-12-28

## 2021-11-24 RX ORDER — SPIRONOLACTONE 25 MG/1
TABLET ORAL
COMMUNITY
End: 2021-12-28

## 2021-11-24 RX ORDER — DIAZEPAM 5 MG/1
TABLET ORAL
COMMUNITY
End: 2021-12-28

## 2021-11-24 RX ORDER — CYCLOBENZAPRINE HCL 10 MG
TABLET ORAL
COMMUNITY
End: 2021-12-28

## 2021-11-24 RX ORDER — DULOXETINE 40 MG/1
CAPSULE, DELAYED RELEASE ORAL
COMMUNITY
End: 2021-11-24 | Stop reason: SDUPTHER

## 2021-11-24 RX ORDER — DULOXETINE 40 MG/1
CAPSULE, DELAYED RELEASE ORAL
Qty: 90 CAPSULE | Refills: 1 | Status: SHIPPED | OUTPATIENT
Start: 2021-11-24 | End: 2022-12-22

## 2021-11-24 RX ORDER — PRAZOSIN HYDROCHLORIDE 1 MG/1
CAPSULE ORAL
COMMUNITY
End: 2021-12-28

## 2021-12-13 RX ORDER — ROSUVASTATIN CALCIUM 20 MG/1
TABLET, COATED ORAL
Qty: 90 TABLET | Refills: 0 | Status: SHIPPED | OUTPATIENT
Start: 2021-12-13 | End: 2021-12-28

## 2021-12-28 ENCOUNTER — OFFICE VISIT (OUTPATIENT)
Dept: FAMILY MEDICINE CLINIC | Facility: CLINIC | Age: 46
End: 2021-12-28

## 2021-12-28 VITALS
WEIGHT: 240 LBS | HEART RATE: 103 BPM | BODY MASS INDEX: 34.36 KG/M2 | SYSTOLIC BLOOD PRESSURE: 136 MMHG | OXYGEN SATURATION: 97 % | DIASTOLIC BLOOD PRESSURE: 80 MMHG | HEIGHT: 70 IN | TEMPERATURE: 97.7 F

## 2021-12-28 DIAGNOSIS — I10 PRIMARY HYPERTENSION: ICD-10-CM

## 2021-12-28 DIAGNOSIS — E11.65 TYPE 2 DIABETES MELLITUS WITH HYPERGLYCEMIA, WITH LONG-TERM CURRENT USE OF INSULIN (HCC): Primary | ICD-10-CM

## 2021-12-28 DIAGNOSIS — Z79.4 TYPE 2 DIABETES MELLITUS WITH HYPERGLYCEMIA, WITH LONG-TERM CURRENT USE OF INSULIN (HCC): Primary | ICD-10-CM

## 2021-12-28 PROCEDURE — 99213 OFFICE O/P EST LOW 20 MIN: CPT | Performed by: NURSE PRACTITIONER

## 2021-12-28 NOTE — PROGRESS NOTES
"Chief Complaint  Transitional Care Management    Subjective          Sameer Méndez presents to [facility]  [facility] to [facility]  Patient was diagnosed with Covid in September.  Patient states that he was very ill and had high fevers for approximately 12 days.  Patient states that after this he began having visual changes as well as urinary frequency.  Patient did go to the emergency department where was found that he had elevated blood sugars.  Patient's blood sugar got up to 700 at one-point.  He was admitted and started on IV fluids and insulin.  Patient is currently taking Lantus 20 units daily as well as Metformin 500 mg twice daily.  He does state that he has began exercising daily and eating much healthier.  I did explain to the patient that he needed to check his glucose times a day as he did not have a history of diabetes prior to the Covid this.  I explained to the patient that as he recovered his glucose levels can actually improve and then with him taking insulin and exercising I did not want him to have hypoglycemia.  I did explain to the patient that we would closely monitor him and reduce the medications as necessary.  Fasting glucose this morning was 141.  Blood pressure arrival today is 136/80.  He denies any chest pain shortness breath palpitations this time      Objective   Vital Signs:   /80 (BP Location: Right arm, Patient Position: Sitting, Cuff Size: Adult)   Pulse 103   Temp 97.7 °F (36.5 °C) (Temporal)   Ht 177.8 cm (70\")   Wt 109 kg (240 lb)   SpO2 97%   BMI 34.44 kg/m²     Physical Exam  Vitals reviewed.   Constitutional:       Appearance: Normal appearance.   Cardiovascular:      Rate and Rhythm: Normal rate and regular rhythm.      Heart sounds: Normal heart sounds, S1 normal and S2 normal. No murmur heard.      Pulmonary:      Effort: Pulmonary effort is normal. No respiratory " distress.      Breath sounds: Normal breath sounds.   Skin:     General: Skin is warm and dry.   Neurological:      Mental Status: He is alert and oriented to person, place, and time.   Psychiatric:         Attention and Perception: Attention normal.         Mood and Affect: Mood normal.         Behavior: Behavior normal.        Result Review :                 Assessment and Plan    Diagnoses and all orders for this visit:    1. Type 2 diabetes mellitus with hyperglycemia, with long-term current use of insulin (HCC) (Primary)  Comments:  Continue Lantus 20 units daily.  Orders:  -     CBC (No Diff); Future  -     Comprehensive Metabolic Panel; Future  -     Lipid Panel; Future  -     Hemoglobin A1c; Future    2. Primary hypertension  Comments:  Patient is stable.    Orders:  -     CBC (No Diff); Future  -     Comprehensive Metabolic Panel; Future        Follow Up   Return in about 3 months (around 3/28/2022) for Recheck.  Patient was given instructions and counseling regarding his condition or for health maintenance advice. Please see specific information pulled into the AVS if appropriate.

## 2021-12-29 ENCOUNTER — TELEPHONE (OUTPATIENT)
Dept: FAMILY MEDICINE CLINIC | Facility: CLINIC | Age: 46
End: 2021-12-29

## 2021-12-29 DIAGNOSIS — H53.8 BLURRY VISION: Primary | ICD-10-CM

## 2021-12-29 NOTE — TELEPHONE ENCOUNTER
Caller: Sameer Méndez    Relationship: Self    Best call back number: [phone]    What is the medical concern/diagnosis: BLURRY VISION    What specialty or service is being requested:       EYE SPECIALIST         What is the office location     What is the office phone number:       PATIENT IS REQUESTING A REFERRAL TO EYE SPECIALIST. HE SAID THAT HE HAS BEEN HAVING BLURRY VISION AND WOULD LIKE TO GET THAT CHECKED OUT. PLEASE CALL AND ADVISE IF THIS CAN  BE DONE.           Any additional details: YES

## 2022-01-12 ENCOUNTER — TELEPHONE (OUTPATIENT)
Dept: FAMILY MEDICINE CLINIC | Facility: CLINIC | Age: 47
End: 2022-01-12

## 2022-01-12 NOTE — TELEPHONE ENCOUNTER
Caller: [place], Sameer    Relationship: Self    Best call back number: [phone]    What form or medical record are you requesting: MEDICAL RECORDS GOING BACK FOR 1 YEAR    Who is requesting this form or medical record from you: PATIENT WANTS THEM    How would you like to receive the form or medical records (pick-up, mail, fax): PATIENT WILL  AT OFFICE         Timeframe paperwork needed: ASAP    Additional notes: YES

## 2022-02-09 ENCOUNTER — TREATMENT (OUTPATIENT)
Dept: PHYSICAL THERAPY | Facility: CLINIC | Age: 47
End: 2022-02-09

## 2022-02-09 DIAGNOSIS — M54.42 CHRONIC BILATERAL LOW BACK PAIN WITH BILATERAL SCIATICA: Primary | ICD-10-CM

## 2022-02-09 DIAGNOSIS — G89.29 CHRONIC BILATERAL LOW BACK PAIN WITH BILATERAL SCIATICA: Primary | ICD-10-CM

## 2022-02-09 DIAGNOSIS — M54.41 CHRONIC BILATERAL LOW BACK PAIN WITH BILATERAL SCIATICA: Primary | ICD-10-CM

## 2022-02-09 PROCEDURE — 97161 PT EVAL LOW COMPLEX 20 MIN: CPT | Performed by: PHYSICAL THERAPIST

## 2022-02-09 NOTE — PROGRESS NOTES
Physical Therapy Initial Evaluation and Plan of Care    Patient: Sameer Méndez   : 1975  Diagnosis/ICD-10 Code:  Chronic bilateral low back pain with bilateral sciatica [M54.42, M54.41, G89.29]  Referring practitioner: STEFFANIE Underwood  Date of Initial Visit: 2022  Today's Date: 2022  Patient seen for 1 sessions         Subjective Questionnaire: Oswestry: 27/50= 54%    Subjective Evaluation    History of Present Illness  Mechanism of injury: Pt is a 46 year old male who reports to physical therapy due to chronic low back pain. He reports a recent flair up since he had COVID in 2021. Pt reports that he had about 3 falls as the pain was so intense (2 falls in November and 1 fall in December). He was hospitalized 2021 due to a recent diagnosis of diabetes (and blood sugar level of 700+). Pt ambulates with a straight cane. He also reports bilateral radicular pain in LE. Pt was a patient at our clinic 2 years ago and relief with aquatic therapy and only minimal temporary relief with land based therapy. He wants to try aquatic therapy again. He is currently in pain management and getting injections, which helps temporarily.     Diagnostic Tests  X-ray: normal  Abnormal MRI: Pt had MRI in 2019, but unsure of results.    Treatments  Previous treatment: physical therapy  Patient Goals  Patient goals for therapy: increased strength, decreased pain and improved balance         Objective          Active Range of Motion     Additional Active Range of Motion Details  Lumbar  Active Range              Flexion   30%    Extension  20%         Left Right   Sidebending  40% 40%   Rotation  30% 30%    Pain with ROM       Tests     Lumbar     Left   Positive passive SLR.     Right   Positive passive SLR.     Additional Tests Details  Pt had significant pain in hooklying position and was unable to tolerate further testing. No relief with manual traction.      Assessment & Plan      Assessment  Impairments: abnormal gait, abnormal or restricted ROM, lacks appropriate home exercise program and pain with function  Functional Limitations: walking, uncomfortable because of pain and standing  Goals  Plan Goals: LOW BACK PROBLEMS:    1. The patient complains of low back pain.  LTG 1: 6 weeks:  The patient will report a pain rating of 4/10 or better in order to improve  tolerance to activities of daily living and improve sleep quality.  STATUS:  New  STG 1a: 3 weeks:  The patient will report a pain rating of 6/10 or better.  STATUS:  New  TREATMENT:  Therapeutic exercises, manual therapy, aquatic therapy, home exercise   instruction, and modalities as needed for pain to include:  electrical stimulation, moist heat, ice,   ultrasound, and diathermy.      2. The patient demonstrates weakness of the B hip.  LTG 2: 6 weeks:  The patient will demonstrate 4+ /5 strength for B hip flexion, abduction,  and extension in order to improve hip stability.  STATUS:  New  STG 2a: 3 weeks:  The patient will demonstrate 4 /5 strength for B hip flexion, abduction,  and extension.  STATUS:  New  TREATMENT: Therapeutic exercises, manual therapy, aquatic therapy, home exercise instruction,  and modalities as needed for pain to include:  electrical stimulation, moist heat, ice, ultrasound, and   diathermy.      Plan  Therapy options: will be seen for skilled therapy services  Planned therapy interventions: manual therapy, strengthening, home exercise program, therapeutic activities, soft tissue mobilization and functional ROM exercises  Frequency: 2x week  Duration in weeks: 6  Plan details: Manual therapy, therapeutic exercises, and aquatic therapy.      Pt presents with limitations, that impede his ability to ambulate, stand, sit and perform functional tasks. The skills of a therapist will be required to safely and effectively implement the following treatment plan to restore maximal level of function.      Visit  Diagnoses:    ICD-10-CM ICD-9-CM   1. Chronic bilateral low back pain with bilateral sciatica  M54.42 724.2    M54.41 724.3    G89.29 338.29     Timed:         Manual Therapy:    0     mins  43775;     Therapeutic Exercise:    0     mins  29061;     Neuromuscular [place]:    0    mins  03800;    Therapeutic Activity:     0     mins  88620;     Gait Trainin     mins  98337;     Ultrasound:     0     mins  81570;      Un-Timed:  Electrical Stimulation:    0     mins  85837 ( );  Traction     0     mins 11193  Low Eval     45     Mins  35969  Mod Eval     0     Mins  35897  High Eval                       0     Mins  48682    Timed Treatment:   45   mins   Total Treatment:     45   mins    PT SIGNATURE: Leda Hanna PT     Electronically Signed 2022    [place] License: [id number]    Initial Certification  Certification Period: 2022 thru 2022  I certify that the therapy services are furnished while this patient is under my care.  The services outlined above are required by this patient, and will be reviewed every 90 days.     PHYSICIAN: Michaelle Sanchez, APRN      DATE:     Please sign and return via fax to [phone].. Thank you, [facility].

## 2022-02-14 RX ORDER — LINACLOTIDE 145 UG/1
CAPSULE, GELATIN COATED ORAL
Qty: 90 CAPSULE | Refills: 0 | Status: SHIPPED | OUTPATIENT
Start: 2022-02-14 | End: 2022-05-20

## 2022-02-28 ENCOUNTER — TELEPHONE (OUTPATIENT)
Dept: PHYSICAL THERAPY | Facility: CLINIC | Age: 47
End: 2022-02-28

## 2022-03-10 ENCOUNTER — TREATMENT (OUTPATIENT)
Dept: PHYSICAL THERAPY | Facility: CLINIC | Age: 47
End: 2022-03-10

## 2022-03-10 DIAGNOSIS — G89.29 CHRONIC BILATERAL LOW BACK PAIN WITH BILATERAL SCIATICA: Primary | ICD-10-CM

## 2022-03-10 DIAGNOSIS — M54.42 CHRONIC BILATERAL LOW BACK PAIN WITH BILATERAL SCIATICA: Primary | ICD-10-CM

## 2022-03-10 DIAGNOSIS — M54.41 CHRONIC BILATERAL LOW BACK PAIN WITH BILATERAL SCIATICA: Primary | ICD-10-CM

## 2022-03-10 NOTE — PROGRESS NOTES
PROGRESS NOTE    Patient: Sameer Méndez   : 1975  Diagnosis/ICD-10 Code:  Chronic bilateral low back pain with bilateral sciatica [M54.42, M54.41, G89.29]  Referring practitioner: STEFFANIE Underwood  Date of Initial Visit: Type: THERAPY  Noted: 2022  Today's Date: 3/10/2022  Patient seen for 2 sessions    Subjective:   Sameer Méndez reports: Pt reports back to physical therapy today and has not been able to attend any session since his evaluation as his son broke his foot and he missed a few appointments. Pt has not noticed any improvements. He reports 4/10 back pain and states that it is more manageable today. He reports an average pain of 7/10.  Subjective Questionnaire: Oswestry: 27/50= 57% limitation   Clinical Progress: no change, pt has not attended any follow up appointments.  Home Program Compliance: Yes  Treatment has included: therapeutic exercise, neuromuscular re-education, manual therapy and therapeutic activity    Subjective   Objective          Active Range of Motion     Additional Active Range of Motion Details  Lumbar  Active Range              Flexion   30%    Extension  20%         Left Right   Sidebending  40% 40%   Rotation  40% 40%    Pain with ROM       Tests     Lumbar     Left   Positive passive SLR.     Right   Positive passive SLR.     Additional Tests Details  Pt had significant pain in hooklying position and was unable to tolerate further testing. No relief with manual traction.      Assessment & Plan     Assessment  Impairments: abnormal gait, abnormal or restricted ROM, lacks appropriate home exercise program and pain with function  Functional Limitations: walking, uncomfortable because of pain and standing  Assessment details: Pt has not had any follow up physical therapy appointments due to his son's accident and he has also missed other appointments. He is expected to make some progress with physical therapy and has committed to attending sessions. He will be  treated in the aquatic setting for 1 month.     Goals  Plan Goals: LOW BACK PROBLEMS:    1. The patient complains of low back pain.  LTG 1: 6 weeks:  The patient will report a pain rating of 4/10 or better in order to improve  tolerance to activities of daily living and improve sleep quality.  STATUS:  Not met, progressing  STG 1a: 3 weeks:  The patient will report a pain rating of 6/10 or better.  STATUS:  Not met, progressing  TREATMENT:  Therapeutic exercises, manual therapy, aquatic therapy, home exercise   instruction, and modalities as needed for pain to include:  electrical stimulation, moist heat, ice,   ultrasound, and diathermy.      2. The patient demonstrates weakness of the B hip.  LTG 2: 6 weeks:  The patient will demonstrate 4+ /5 strength for B hip flexion, abduction,  and extension in order to improve hip stability.  STATUS:  Not met, progressing  STG 2a: 3 weeks:  The patient will demonstrate 4 /5 strength for B hip flexion, abduction,  and extension.  STATUS:  Not met, progressing  TREATMENT: Therapeutic exercises, manual therapy, aquatic therapy, home exercise instruction,  and modalities as needed for pain to include:  electrical stimulation, moist heat, ice, ultrasound, and   diathermy.      Plan  Therapy options: will be seen for skilled therapy services  Planned therapy interventions: manual therapy, strengthening, home exercise program, therapeutic activities, soft tissue mobilization and functional ROM exercises  Frequency: 2x week  Duration in weeks: 6  Plan details: Manual therapy, therapeutic exercises, and aquatic therapy.      Progress toward previous goals: Partially Met    Recommendations: Continue as planned  Prognosis to achieve goals: good    PT Signature: Leda Hnana PT    Electronically Signed 3/10/2022    [place] License: [id number]    Based upon review of the patient's progress and continued therapy plan, it is my medical opinion that Sameer Méndez should continue physical  therapy treatment at [facility]  [address] [place] [address] [phone].    Timed:         Manual Therapy:    0     mins  01337;     Therapeutic Exercise:    0     mins  72871;     Neuromuscular Montez:    0    mins  80725;    Therapeutic Activity:     0     mins  62897;     Gait Trainin     mins  60014;     Ultrasound:     0     mins  68335;    Ionto                               0    mins   45081  Aquatic                          0     mins 53520      Un-Timed:  Electrical Stimulation:    0     mins  30749 ( );  Dry Needling     0     mins self-pay  Traction     0     mins 16177      Timed Treatment:   0   mins   Total Treatment:     0   mins

## 2022-03-11 ENCOUNTER — TREATMENT (OUTPATIENT)
Dept: PHYSICAL THERAPY | Facility: CLINIC | Age: 47
End: 2022-03-11

## 2022-03-11 DIAGNOSIS — M54.41 CHRONIC BILATERAL LOW BACK PAIN WITH BILATERAL SCIATICA: Primary | ICD-10-CM

## 2022-03-11 DIAGNOSIS — M54.42 CHRONIC BILATERAL LOW BACK PAIN WITH BILATERAL SCIATICA: Primary | ICD-10-CM

## 2022-03-11 DIAGNOSIS — G89.29 CHRONIC BILATERAL LOW BACK PAIN WITH BILATERAL SCIATICA: Primary | ICD-10-CM

## 2022-03-11 PROCEDURE — 97113 AQUATIC THERAPY/EXERCISES: CPT | Performed by: PHYSICAL THERAPIST

## 2022-03-11 NOTE — PROGRESS NOTES
Physical Therapy Daily Treatment Note      Patient: Sameer Méndez   : 1975  Referring practitioner: STEFFANIE Underwood  Date of Initial Visit: Type: THERAPY  Noted: 2022  Today's Date: 3/11/2022  Patient seen for 3 sessions           Subjective Questionnaire:       Subjective Evaluation    History of Present Illness    Subjective comment: Pt reports feeling stiff today.  Pt reported having 2 injections in cervical and lumbar spine and hasn't had much relief.       Objective   See Exercise, Manual, and Modality Logs for complete treatment.       Assessment & Plan     Assessment    Assessment details: Pt reported feeling good after aquatics.  Continue with POC.        Visit Diagnoses:    ICD-10-CM ICD-9-CM   1. Chronic bilateral low back pain with bilateral sciatica  M54.42 724.2    M54.41 724.3    G89.29 338.29       Progress per Plan of Care and Progress strengthening /stabilization /functional activity           Timed:  Manual Therapy:         mins  61121;  Therapeutic Exercise:         mins  49969;     Neuromuscular Montez:        mins  84350;    Therapeutic Activity:          mins  59875;     Gait Training:           mins  68128;     Ultrasound:          mins  84447;    Electrical Stimulation:         mins  97386 ( );  Aquatic Therapy     28     mins  94134    Untimed:  Electrical Stimulation:         mins  90583 ( );  Mechanical Traction:         mins  15709;     Timed Treatment:   28   mins   Total Treatment:     28   mins    Electronically signed    Kristel Chairez PTA  Physical Therapist Assistant    [place] license: [id number]

## 2022-03-14 RX ORDER — ROSUVASTATIN CALCIUM 20 MG/1
TABLET, COATED ORAL
Qty: 90 TABLET | Refills: 0 | OUTPATIENT
Start: 2022-03-14

## 2022-03-15 ENCOUNTER — TREATMENT (OUTPATIENT)
Dept: PHYSICAL THERAPY | Facility: CLINIC | Age: 47
End: 2022-03-15

## 2022-03-15 DIAGNOSIS — G89.29 CHRONIC BILATERAL LOW BACK PAIN WITH BILATERAL SCIATICA: Primary | ICD-10-CM

## 2022-03-15 DIAGNOSIS — M54.42 CHRONIC BILATERAL LOW BACK PAIN WITH BILATERAL SCIATICA: Primary | ICD-10-CM

## 2022-03-15 DIAGNOSIS — M54.41 CHRONIC BILATERAL LOW BACK PAIN WITH BILATERAL SCIATICA: Primary | ICD-10-CM

## 2022-03-15 PROCEDURE — 97113 AQUATIC THERAPY/EXERCISES: CPT | Performed by: PHYSICAL THERAPIST

## 2022-03-15 NOTE — PROGRESS NOTES
Physical Therapy Daily Progress Note        Patient: Sameer Méndez   : 1975  Diagnosis/ICD-10 Code:  Chronic bilateral low back pain with bilateral sciatica [M54.42, M54.41, G89.29]  Referring practitioner: STEFFANIE Underwood  Date of Initial Visit: Type: THERAPY  Noted: 2022  Today's Date: 3/15/2022  Patient seen for 4 sessions             Subjective   Sameer Méndez reports: being stiff this morning but that's pretty common for him. States that pain hangs out on the right side more and goes all the way up between his shoulder blades. Reports having good pain relief after aquatic therapy.    Objective   No complaints of increased pain or discomfort.     See Exercise, Manual, and Modality Logs for complete treatment.       Assessment/Plan  Sameer still experiencing increased low back  pain, especially on the right side. Pt tolerated aquatic exercises well, no complaints of increased pain or discomfort. Pt would benefit from skilled PT to address Range of Motion  and Strength deficits, pain management and any concerns with ADLs.     Progress per Plan of Care           Timed:  Manual Therapy:         mins  86059;  Therapeutic Exercise:         mins  95397;     Neuromuscular Montez:        mins  85224;    Therapeutic Activity:          mins  35662;     Gait Training:           mins  57692;    Aquatic Therapy:     25     mins  63735;       Untimed:  Electrical Stimulation:         mins  56769 ( );  Mechanical Traction:         mins  50044;       Timed Treatment:   25   mins   Total Treatment:     25   mins      Electronically signed:   Sharla Kaye PTA  Physical Therapist Assistant  [facility] License #: [id number]

## 2022-03-18 ENCOUNTER — TREATMENT (OUTPATIENT)
Dept: PHYSICAL THERAPY | Facility: CLINIC | Age: 47
End: 2022-03-18

## 2022-03-18 DIAGNOSIS — M54.41 CHRONIC BILATERAL LOW BACK PAIN WITH BILATERAL SCIATICA: Primary | ICD-10-CM

## 2022-03-18 DIAGNOSIS — G89.29 CHRONIC BILATERAL LOW BACK PAIN WITH BILATERAL SCIATICA: Primary | ICD-10-CM

## 2022-03-18 DIAGNOSIS — M54.42 CHRONIC BILATERAL LOW BACK PAIN WITH BILATERAL SCIATICA: Primary | ICD-10-CM

## 2022-03-18 PROCEDURE — 97113 AQUATIC THERAPY/EXERCISES: CPT | Performed by: PHYSICAL THERAPIST

## 2022-03-18 NOTE — PROGRESS NOTES
" Physical Therapy Daily Progress Note        Patient: Sameer Méndez   : 1975  Diagnosis/ICD-10 Code:  Chronic bilateral low back pain with bilateral sciatica [M54.42, M54.41, G89.29]  Referring practitioner: STEFFANIE Underwood  Date of Initial Visit: Type: THERAPY  Noted: 2022  Today's Date: 3/18/2022  Patient seen for 5 sessions             Subjective   Sameer Méndez reports: feeling a little better today, states that he went to the MD the other day and they plan to start \"spot\" injections.     Still having mornings when he wakes up and his right leg is numb.     Objective   No complaints of increased pain or discomfort.     See Exercise, Manual, and Modality Logs for complete treatment.       Assessment/Plan  Sameer progressing as evident by decreased overall back  pain, although still getting numbness some mornings. Pt tolerated exercises well, no complaints of increased pain or discomfort. Pt would benefit from skilled PT to address Range of Motion  and Strength deficits, pain management and any concerns with ADLs.       Progress per Plan of Care           Timed:  Manual Therapy:         mins  35333;  Therapeutic Exercise:         mins  41307;     Neuromuscular Montez:        mins  22561;    Therapeutic Activity:          mins  05328;     Gait Training:           mins  08249;    Aquatic Therapy:     30     mins  24217;       Untimed:  Electrical Stimulation:         mins  29070 ( );  Mechanical Traction:         mins  69544;       Timed Treatment:   30   mins   Total Treatment:     30   mins      Electronically signed:   Sharla Kaye PTA  Physical Therapist Assistant  [facility] License #: [id number]  "

## 2022-03-22 ENCOUNTER — TREATMENT (OUTPATIENT)
Dept: PHYSICAL THERAPY | Facility: CLINIC | Age: 47
End: 2022-03-22

## 2022-03-22 DIAGNOSIS — M54.41 CHRONIC BILATERAL LOW BACK PAIN WITH BILATERAL SCIATICA: Primary | ICD-10-CM

## 2022-03-22 DIAGNOSIS — G89.29 CHRONIC BILATERAL LOW BACK PAIN WITH BILATERAL SCIATICA: Primary | ICD-10-CM

## 2022-03-22 DIAGNOSIS — M54.42 CHRONIC BILATERAL LOW BACK PAIN WITH BILATERAL SCIATICA: Primary | ICD-10-CM

## 2022-03-22 PROCEDURE — 97113 AQUATIC THERAPY/EXERCISES: CPT | Performed by: PHYSICAL THERAPIST

## 2022-03-22 NOTE — PROGRESS NOTES
Physical Therapy Daily Progress Note        Patient: Sameer Méndez   : 1975  Diagnosis/ICD-10 Code:  Chronic bilateral low back pain with bilateral sciatica [M54.42, M54.41, G89.29]  Referring practitioner: STEFFANIE Underwood  Date of Initial Visit: Type: THERAPY  Noted: 2022  Today's Date: 3/22/2022  Patient seen for 6 sessions             Subjective   Sameer Méndez reports: having pain up underneath his right shoulder blade and wraps around towards his chest.     Objective   Some increased discomfort in side with trunk rotations.     See Exercise, Manual, and Modality Logs for complete treatment.       Assessment/Plan  Sameer still experiencing increased  back  pain, even pain that wraps around towards sternum. Pt had some increased discomfort with trunk rotations. Pt would benefit from skilled PT to address Range of Motion  and Strength deficits, pain management and any concerns with ADLs.     Progress per Plan of Care           Timed:  Manual Therapy:         mins  48990;  Therapeutic Exercise:         mins  74892;     Neuromuscular Montez:        mins  73753;    Therapeutic Activity:          mins  60990;     Gait Training:           mins  23246;    Aquatic Therapy:     25     mins  84059;       Untimed:  Electrical Stimulation:         mins  43138 ( );  Mechanical Traction:         mins  35381;       Timed Treatment:   25   mins   Total Treatment:     25   mins      Electronically signed:   Sharla Kaye PTA  Physical Therapist Assistant  [place] License #: [id number]

## 2022-03-24 ENCOUNTER — LAB (OUTPATIENT)
Dept: LAB | Facility: HOSPITAL | Age: 47
End: 2022-03-24

## 2022-03-24 DIAGNOSIS — I10 PRIMARY HYPERTENSION: ICD-10-CM

## 2022-03-24 DIAGNOSIS — E11.65 TYPE 2 DIABETES MELLITUS WITH HYPERGLYCEMIA, WITH LONG-TERM CURRENT USE OF INSULIN: ICD-10-CM

## 2022-03-24 DIAGNOSIS — Z79.4 TYPE 2 DIABETES MELLITUS WITH HYPERGLYCEMIA, WITH LONG-TERM CURRENT USE OF INSULIN: ICD-10-CM

## 2022-03-24 LAB
ALBUMIN SERPL-MCNC: 4.5 G/DL (ref 3.5–5.2)
ALBUMIN/GLOB SERPL: 2 G/DL
ALP SERPL-CCNC: 68 U/L (ref 39–117)
ALT SERPL W P-5'-P-CCNC: 15 U/L (ref 1–41)
ANION GAP SERPL CALCULATED.3IONS-SCNC: 9.5 MMOL/L (ref 5–15)
AST SERPL-CCNC: 22 U/L (ref 1–40)
BILIRUB SERPL-MCNC: 0.8 MG/DL (ref 0–1.2)
BUN SERPL-MCNC: 17 MG/DL (ref 6–20)
BUN/CREAT SERPL: 14 (ref 7–25)
CALCIUM SPEC-SCNC: 9.2 MG/DL (ref 8.6–10.5)
CHLORIDE SERPL-SCNC: 106 MMOL/L (ref 98–107)
CHOLEST SERPL-MCNC: 232 MG/DL (ref 0–200)
CO2 SERPL-SCNC: 25.5 MMOL/L (ref 22–29)
CREAT SERPL-MCNC: 1.21 MG/DL (ref 0.76–1.27)
DEPRECATED RDW RBC AUTO: 42.6 FL (ref 37–54)
EGFRCR SERPLBLD CKD-EPI 2021: 73.9 ML/MIN/1.73
ERYTHROCYTE [DISTWIDTH] IN BLOOD BY AUTOMATED COUNT: 13.3 % (ref 12.3–15.4)
GLOBULIN UR ELPH-MCNC: 2.3 GM/DL
GLUCOSE SERPL-MCNC: 94 MG/DL (ref 65–99)
HBA1C MFR BLD: 5.8 % (ref 4.8–5.6)
HCT VFR BLD AUTO: 39 % (ref 37.5–51)
HDLC SERPL-MCNC: 44 MG/DL (ref 40–60)
HGB BLD-MCNC: 13.1 G/DL (ref 13–17.7)
LDLC SERPL CALC-MCNC: 161 MG/DL (ref 0–100)
LDLC/HDLC SERPL: 3.6 {RATIO}
MCH RBC QN AUTO: 29.8 PG (ref 26.6–33)
MCHC RBC AUTO-ENTMCNC: 33.6 G/DL (ref 31.5–35.7)
MCV RBC AUTO: 88.8 FL (ref 79–97)
PLATELET # BLD AUTO: 185 10*3/MM3 (ref 140–450)
PMV BLD AUTO: 10.5 FL (ref 6–12)
POTASSIUM SERPL-SCNC: 4.2 MMOL/L (ref 3.5–5.2)
PROT SERPL-MCNC: 6.8 G/DL (ref 6–8.5)
RBC # BLD AUTO: 4.39 10*6/MM3 (ref 4.14–5.8)
SODIUM SERPL-SCNC: 141 MMOL/L (ref 136–145)
TRIGL SERPL-MCNC: 148 MG/DL (ref 0–150)
VLDLC SERPL-MCNC: 27 MG/DL (ref 5–40)
WBC NRBC COR # BLD: 4.78 10*3/MM3 (ref 3.4–10.8)

## 2022-03-24 PROCEDURE — 85027 COMPLETE CBC AUTOMATED: CPT

## 2022-03-24 PROCEDURE — 83036 HEMOGLOBIN GLYCOSYLATED A1C: CPT

## 2022-03-24 PROCEDURE — 36415 COLL VENOUS BLD VENIPUNCTURE: CPT

## 2022-03-24 PROCEDURE — 80053 COMPREHEN METABOLIC PANEL: CPT

## 2022-03-24 PROCEDURE — 80061 LIPID PANEL: CPT

## 2022-03-25 ENCOUNTER — TREATMENT (OUTPATIENT)
Dept: PHYSICAL THERAPY | Facility: CLINIC | Age: 47
End: 2022-03-25

## 2022-03-25 DIAGNOSIS — G89.29 CHRONIC BILATERAL LOW BACK PAIN WITH BILATERAL SCIATICA: Primary | ICD-10-CM

## 2022-03-25 DIAGNOSIS — M54.42 CHRONIC BILATERAL LOW BACK PAIN WITH BILATERAL SCIATICA: Primary | ICD-10-CM

## 2022-03-25 DIAGNOSIS — M54.41 CHRONIC BILATERAL LOW BACK PAIN WITH BILATERAL SCIATICA: Primary | ICD-10-CM

## 2022-03-25 PROCEDURE — 97110 THERAPEUTIC EXERCISES: CPT | Performed by: PHYSICAL THERAPIST

## 2022-03-25 PROCEDURE — G0283 ELEC STIM OTHER THAN WOUND: HCPCS | Performed by: PHYSICAL THERAPIST

## 2022-03-25 RX ORDER — INSULIN GLARGINE 100 [IU]/ML
20 INJECTION, SOLUTION SUBCUTANEOUS DAILY
COMMUNITY
End: 2022-08-24

## 2022-03-25 RX ORDER — CYCLOBENZAPRINE HCL 10 MG
10 TABLET ORAL 3 TIMES DAILY PRN
COMMUNITY
Start: 2022-03-16 | End: 2022-08-24 | Stop reason: ALTCHOICE

## 2022-03-25 NOTE — PROGRESS NOTES
Physical Therapy Daily Progress Note        Patient: Sameer Méndez   : 1975  Diagnosis/ICD-10 Code:  Chronic bilateral low back pain with bilateral sciatica [M54.42, M54.41, G89.29]  Referring practitioner: STEFFANIE Underwood  Date of Initial Visit: Type: THERAPY  Noted: 2022  Today's Date: 3/25/2022  Patient seen for 7 sessions             Subjective   Sameer Méndez reports: getting up off the bed and his back gave out on his, states that up until this point his mid back had been bothering him more so he wasn't expecting the pain in his low back.     Objective   Pt ambulating with SPC and forward flexed posture.     See Exercise, Manual, and Modality Logs for complete treatment.       Assessment/Plan  Sameer still experiencing increased low back  pain. Pt had some increased discomfort with exercises. Pt would benefit from skilled PT to address Range of Motion  and Strength deficits, pain management and any concerns with ADLs.     Progress per Plan of Care           Timed:  Manual Therapy:         mins  80869;  Therapeutic Exercise:    10     mins  72181;     Neuromuscular Montez:        mins  25609;    Therapeutic Activity:         mins  00274;     Gait Training:           mins  06711;    Aquatic Therapy:          mins  83012;       Untimed:  Electrical Stimulation:    15     mins  76168 ( );  Mechanical Traction:         mins  26196;       Timed Treatment:   25   mins   Total Treatment:     25   mins      Electronically signed:   Sharla Kaye PTA  Physical Therapist Assistant  [place] License #: [id number]

## 2022-03-28 ENCOUNTER — OFFICE VISIT (OUTPATIENT)
Dept: FAMILY MEDICINE CLINIC | Facility: CLINIC | Age: 47
End: 2022-03-28

## 2022-03-28 VITALS
OXYGEN SATURATION: 98 % | DIASTOLIC BLOOD PRESSURE: 80 MMHG | BODY MASS INDEX: 32.96 KG/M2 | SYSTOLIC BLOOD PRESSURE: 128 MMHG | HEIGHT: 70 IN | TEMPERATURE: 97.8 F | WEIGHT: 230.2 LBS | HEART RATE: 77 BPM

## 2022-03-28 DIAGNOSIS — Z00.00 MEDICARE ANNUAL WELLNESS VISIT, SUBSEQUENT: ICD-10-CM

## 2022-03-28 DIAGNOSIS — M54.41 CHRONIC RIGHT-SIDED LOW BACK PAIN WITH RIGHT-SIDED SCIATICA: ICD-10-CM

## 2022-03-28 DIAGNOSIS — G89.29 CHRONIC RIGHT-SIDED LOW BACK PAIN WITH RIGHT-SIDED SCIATICA: ICD-10-CM

## 2022-03-28 DIAGNOSIS — I10 PRIMARY HYPERTENSION: ICD-10-CM

## 2022-03-28 DIAGNOSIS — E78.5 HYPERLIPIDEMIA, UNSPECIFIED HYPERLIPIDEMIA TYPE: ICD-10-CM

## 2022-03-28 DIAGNOSIS — E11.40 TYPE 2 DIABETES MELLITUS WITH DIABETIC NEUROPATHY, WITHOUT LONG-TERM CURRENT USE OF INSULIN: Primary | ICD-10-CM

## 2022-03-28 PROCEDURE — G0439 PPPS, SUBSEQ VISIT: HCPCS | Performed by: NURSE PRACTITIONER

## 2022-03-28 PROCEDURE — 1170F FXNL STATUS ASSESSED: CPT | Performed by: NURSE PRACTITIONER

## 2022-03-28 PROCEDURE — 1159F MED LIST DOCD IN RCRD: CPT | Performed by: NURSE PRACTITIONER

## 2022-03-28 PROCEDURE — 99214 OFFICE O/P EST MOD 30 MIN: CPT | Performed by: NURSE PRACTITIONER

## 2022-03-28 RX ORDER — PRAVASTATIN SODIUM 80 MG/1
80 TABLET ORAL NIGHTLY
Qty: 90 TABLET | Refills: 1 | Status: SHIPPED | OUTPATIENT
Start: 2022-03-28 | End: 2022-09-26

## 2022-03-28 NOTE — PROGRESS NOTES
The ABCs of the Annual Wellness Visit  Subsequent Medicare Wellness Visit    Chief Complaint   Patient presents with   • Diabetes     3 month follow up      Subjective    History of Present Illness:  Sameer Méndez is a 47 y.o. male who presents for a Subsequent Medicare Wellness Visit.  Patient also presents to the office for a 3-month follow-up regarding his diabetes, hypertension and hyperlipidemia.  I did review review recent lab work with the patient.  A1c was 5.8%.  I did discuss transitioning the patient off of his insulin and starting him on Ozempic in conjunction with his Metformin.  Patient states that he will continue to check his glucose levels on a daily basis.  I explained that we would recheck his A1c in 3 months to see how well he was doing.  Patient is following up with pain management regarding his back pain.  He denies any other concerns or complaints at this time.  Blood pressure arrival today is 128/80.  Denies any chest pain shortness breath palpitations this time    The following portions of the patient's history were reviewed and   updated as appropriate: past family history, past social history and past surgical history.    Compared to one year ago, the patient feels his physical   health is the same.    Compared to one year ago, the patient feels his mental   health is the same.    Recent Hospitalizations:  He was not admitted to the hospital during the last year.       Current Medical Providers:  Patient Care Team:  Tre Hdz APRN as PCP - General (Nurse Practitioner)    Outpatient Medications Prior to Visit   Medication Sig Dispense Refill   • albuterol sulfate  (90 Base) MCG/ACT inhaler Inhale 2 puffs Every 4 (Four) Hours As Needed for Wheezing. 8 g 1   • allopurinol (ZYLOPRIM) 100 MG tablet Take 100 mg by mouth Daily.     • amLODIPine (NORVASC) 10 MG tablet Take 10 mg by mouth Daily.     • Cholecalciferol 25 MCG (1000 UT) capsule Take 2,000 Units by mouth Daily.     •  cyclobenzaprine (FLEXERIL) 10 MG tablet Take 10 mg by mouth 3 (Three) Times a Day As Needed.     • DULoxetine HCl 40 MG capsule delayed-release particles TAKE 1 CAPSULE(40 MG) BY MOUTH EVERY DAY 90 capsule 1   • febuxostat (ULORIC) 80 MG tablet tablet Take 80 mg by mouth Daily.     • gabapentin (NEURONTIN) 600 MG tablet Take 600 mg by mouth 3 (Three) Times a Day.     • Insulin Glargine (Lantus SoloStar) 100 UNIT/ML injection pen Inject 20 Units under the skin into the appropriate area as directed Daily.     • Linzess 145 MCG capsule capsule TAKE 1 CAPSULE(145 MCG) BY MOUTH EVERY DAY 30 MINUTES BEFORE FIRST MEAL OF THE DAY ON AN EMPTY STOMACH 90 capsule 0   • losartan (COZAAR) 100 MG tablet Take 100 mg by mouth Daily.     • melatonin 3-10 MG tablet tablet Take 1 tablet by mouth At Night As Needed.     • metFORMIN (GLUCOPHAGE) 500 MG tablet Take 500 mg by mouth 2 (Two) Times a Day With Meals.     • metoprolol succinate XL (TOPROL-XL) 25 MG 24 hr tablet TAKE 1 TABLET BY MOUTH EVERY DAY (Patient taking differently: Take 50 mg by mouth Daily.) 90 tablet 1   • ondansetron ODT (ZOFRAN-ODT) 4 MG disintegrating tablet Place 1 tablet on the tongue Every 8 (Eight) Hours As Needed for Nausea or Vomiting. 15 tablet 0   • rizatriptan (MAXALT) 10 MG tablet Take 10 mg by mouth 1 (One) Time As Needed.     • pravastatin (PRAVACHOL) 10 MG tablet Take 40 mg by mouth Every Night.     • Testosterone Cypionate (DEPOTESTOTERONE CYPIONATE) 200 MG/ML injection 0.5 mL.       No facility-administered medications prior to visit.       No opioid medication identified on active medication list. I have reviewed chart for other potential  high risk medication/s and harmful drug interactions in the elderly.          Aspirin is not on active medication list.  Aspirin use is not indicated based on review of current medical condition/s. Risk of harm outweighs potential benefits.  .    Patient Active Problem List   Diagnosis   • Cervical radiculopathy  "  • Cervical spondylosis   • DDD (degenerative disc disease), cervical   • Degeneration of lumbar intervertebral disc   • Lumbar spondylosis   • Displacement of lumbar intervertebral disc without myelopathy   • Gout   • Hypertension   • Shoulder pain   • Neck pain   • Low back pain   • Foot pain     Advance Care Planning  Advance Directive is not on file.  ACP discussion was held with the patient during this visit. Patient does not have an advance directive, declines further assistance.          Objective    Vitals:    03/28/22 1125   BP: 128/80   BP Location: Right arm   Patient Position: Sitting   Cuff Size: Adult   Pulse: 77   Temp: 97.8 °F (36.6 °C)   TempSrc: Temporal   SpO2: 98%   Weight: 104 kg (230 lb 3.2 oz)   Height: 177.8 cm (70\")     BMI Readings from Last 1 Encounters:   03/28/22 33.03 kg/m²   BMI is above normal parameters. Recommendations include: nutrition counseling and pharmacological intervention    Does the patient have evidence of cognitive impairment? No    Physical Exam  Lab Results   Component Value Date    TRIG 148 03/24/2022    HDL 44 03/24/2022     (H) 03/24/2022    VLDL 27 03/24/2022    HGBA1C 5.80 (H) 03/24/2022            HEALTH RISK ASSESSMENT    Smoking Status:  Social History     Tobacco Use   Smoking Status Never Smoker   Smokeless Tobacco Never Used     Alcohol Consumption:  Social History     Substance and Sexual Activity   Alcohol Use Not Currently     Fall Risk Screen:    STEADI Fall Risk Assessment was completed, and patient is at MODERATE risk for falls. Assessment completed on:3/28/2022    Depression Screening:  PHQ-2/PHQ-9 Depression Screening 3/28/2022   Retired PHQ-9 Total Score -   Retired Total Score -   Little Interest or Pleasure in Doing Things 0-->not at all   Feeling Down, Depressed or Hopeless 0-->not at all   PHQ-9: Brief Depression Severity Measure Score 0       Health Habits and Functional and Cognitive Screening:  Functional & Cognitive Status 3/28/2022 "   Do you have difficulty preparing food and eating? No   Do you have difficulty bathing yourself, getting dressed or grooming yourself? Yes   Do you have difficulty using the toilet? No   Do you have difficulty moving around from place to place? Yes   Do you have trouble with steps or getting out of a bed or a chair? Yes   Current Diet Well Balanced Diet   Dental Exam Up to date   Eye Exam Up to date   Exercise (times per week) 4 times per week   Current Exercises Include Walking   Do you need help using the phone?  No   Are you deaf or do you have serious difficulty hearing?  No   Do you need help with transportation? No   Do you need help shopping? Yes   Do you need help preparing meals?  Yes   Do you need help with housework?  Yes   Do you need help with laundry? Yes   Do you need help taking your medications? No   Do you need help managing money? No   Do you ever drive or ride in a car without wearing a seat belt? No   Have you felt unusual stress, anger or loneliness in the last month? Yes   Who do you live with? Spouse   If you need help, do you have trouble finding someone available to you? No   Have you been bothered in the last four weeks by sexual problems? No   Do you have difficulty concentrating, remembering or making decisions? No       Age-appropriate Screening Schedule:  Refer to the list below for future screening recommendations based on patient's age, sex and/or medical conditions. Orders for these recommended tests are listed in the plan section. The patient has been provided with a written plan.    Health Maintenance   Topic Date Due   • URINE MICROALBUMIN  Never done   • TDAP/TD VACCINES (1 - Tdap) Never done   • DIABETIC FOOT EXAM  Never done   • INFLUENZA VACCINE  03/28/2023 (Originally 8/1/2021)   • HEMOGLOBIN A1C  09/24/2022   • DIABETIC EYE EXAM  01/12/2023   • LIPID PANEL  03/24/2023              Assessment/Plan   CMS Preventative Services Quick Reference  Risk Factors Identified During  Encounter  Cardiovascular Disease  Chronic Pain   Obesity/Overweight   The above risks/problems have been discussed with the patient.  Follow up actions/plans if indicated are seen below in the Assessment/Plan Section.  Pertinent information has been shared with the patient in the After Visit Summary.    Diagnoses and all orders for this visit:    1. Type 2 diabetes mellitus with diabetic neuropathy, without long-term current use of insulin (HCC) (Primary)  -     CBC (No Diff); Future  -     Comprehensive Metabolic Panel; Future  -     Hemoglobin A1c; Future  -     Semaglutide,0.25 or 0.5MG/DOS, (OZEMPIC) 2 MG/1.5ML solution pen-injector; Inject 0.5 mg under the skin into the appropriate area as directed 1 (One) Time Per Week.  Dispense: 1 pen; Refill: 0    2. Primary hypertension  -     CBC (No Diff); Future  -     Comprehensive Metabolic Panel; Future    3. Chronic right-sided low back pain with right-sided sciatica    4. Hyperlipidemia, unspecified hyperlipidemia type  -     pravastatin (PRAVACHOL) 80 MG tablet; Take 1 tablet by mouth Every Night.  Dispense: 90 tablet; Refill: 1  -     CBC (No Diff); Future  -     Comprehensive Metabolic Panel; Future  -     Lipid Panel; Future    5. Medicare annual wellness visit, subsequent        Follow Up:   Return in about 3 months (around 6/28/2022) for Recheck.     An After Visit Summary and PPPS were made available to the patient.

## 2022-03-29 ENCOUNTER — TREATMENT (OUTPATIENT)
Dept: PHYSICAL THERAPY | Facility: CLINIC | Age: 47
End: 2022-03-29

## 2022-03-29 DIAGNOSIS — M54.41 CHRONIC BILATERAL LOW BACK PAIN WITH BILATERAL SCIATICA: Primary | ICD-10-CM

## 2022-03-29 DIAGNOSIS — G89.29 CHRONIC BILATERAL LOW BACK PAIN WITH BILATERAL SCIATICA: Primary | ICD-10-CM

## 2022-03-29 DIAGNOSIS — M54.42 CHRONIC BILATERAL LOW BACK PAIN WITH BILATERAL SCIATICA: Primary | ICD-10-CM

## 2022-03-29 PROCEDURE — 97113 AQUATIC THERAPY/EXERCISES: CPT | Performed by: PHYSICAL THERAPIST

## 2022-03-29 NOTE — PROGRESS NOTES
Physical Therapy Daily Progress Note    Patient: Sameer Méndez   : 1975  Diagnosis/ICD-10 Code:  Chronic bilateral low back pain with bilateral sciatica [M54.42, M54.41, G89.29]  Referring practitioner: STEFFANIE Underwodo  Date of Initial Visit: Type: THERAPY  Noted: 2022  Today's Date: 3/29/2022  Patient seen for 8 sessions           Subjective   The patient reported that his back is feeling better today than last session. IFC really helped reduce the tightness in his back.    Objective   See Exercise, Manual, and Modality Logs for complete treatment.     Assessment/Plan  The patient demonstrated good tolerance to all aquatic therapy interventions today. He will be re-evaluated next session.       Timed:  Manual Therapy:    0     mins  47289;  Therapeutic Exercise:    0     mins  56091;     Neuromuscular Montez:   0    mins  01856;    Therapeutic Activity:     0     mins  43766;     Gait Trainin     mins  84384;     Aquatics                         27      mins  69966    Un-timed:  Mechanical Traction      0     mins  41740  Dry Needling     0     mins self-pay  Electrical Stimulation:    0     mins  42460 ( );      Timed Treatment:   27   mins   Total Treatment:     27   mins    Darryl Hinkle PT  Physical Therapist    Electronically signed 3/29/2022    [place] License: [id number] - [id number]

## 2022-04-01 ENCOUNTER — TREATMENT (OUTPATIENT)
Dept: PHYSICAL THERAPY | Facility: CLINIC | Age: 47
End: 2022-04-01

## 2022-04-01 DIAGNOSIS — M25.60 STIFFNESS IN JOINT: ICD-10-CM

## 2022-04-01 DIAGNOSIS — G89.29 CHRONIC BILATERAL LOW BACK PAIN WITH BILATERAL SCIATICA: Primary | ICD-10-CM

## 2022-04-01 DIAGNOSIS — M54.41 CHRONIC BILATERAL LOW BACK PAIN WITH BILATERAL SCIATICA: Primary | ICD-10-CM

## 2022-04-01 DIAGNOSIS — M54.42 CHRONIC BILATERAL LOW BACK PAIN WITH BILATERAL SCIATICA: Primary | ICD-10-CM

## 2022-04-01 PROCEDURE — 97110 THERAPEUTIC EXERCISES: CPT | Performed by: PHYSICAL THERAPIST

## 2022-04-01 PROCEDURE — 97530 THERAPEUTIC ACTIVITIES: CPT | Performed by: PHYSICAL THERAPIST

## 2022-04-01 NOTE — PROGRESS NOTES
Progress Note      Patient: Sameer Méndez   : 1975  Diagnosis/ICD-10 Code:  Chronic bilateral low back pain with bilateral sciatica [M54.42, M54.41, G89.29]  Referring practitioner: STEFFANIE Underwood  Date of Initial Visit: Type: THERAPY  Noted: 2022  Today's Date: 2022  Patient seen for 9 sessions      Subjective:   Subjective Questionnaire: Oswestry: 22/50 = 44% Disability  Clinical Progress: improved  Home Program Compliance: Yes  Treatment has included: therapeutic exercise, therapeutic activity and electrical stimulation and aquatic therapy.    Subjective   The patient reported that his back pain today is a 4/10. Today is a good day for him. After experiencing a set back last week, his back has been feeling a little better. He always feels better on days when he comes to aquatic therapy. He still has moderate low back and stiffness which limit his overall mobility. He would like to continue with PT for another month.    Objective          Active Range of Motion     Additional Active Range of Motion Details  Lumbar  Active Range              Flexion   30%    Extension  20%         Left Right   Sidebending  50% 40%   Rotation  50% 40%    Pain with ROM       Strength/Myotome Testing     Left Hip   Planes of Motion   Flexion: 4  Abduction: 4  Adduction: 4-    Right Hip   Planes of Motion   Flexion: 3+  Abduction: 4-  Adduction: 4-    Additional Strength Details  All hip strength testing performed in the seated position    Tests     Lumbar     Left   Positive passive SLR.     Right   Positive passive SLR.       See Exercise, Manual, and Modality Logs for complete treatment.     Assessment & Plan     Assessment  Impairments: abnormal gait, abnormal or restricted ROM, lacks appropriate home exercise program and pain with function  Functional Limitations: walking, uncomfortable because of pain and standing  Assessment details: The patient was re-evaluated today and presents with improvements in  low back pain, lumbar flexibility, and function (INES) compared to his previous assessment. He has progressed well with PT, but continues to present with functional deficits that require ongoing PT. He would benefit from additional PT at this time.    Goals  Plan Goals: LOW BACK PROBLEMS:    1. The patient complains of low back pain.  LTG 1: 6 weeks:  The patient will report a pain rating of 4/10 or better in order to improve  tolerance to activities of daily living and improve sleep quality.  STATUS:  Met  STG 1a: 3 weeks:  The patient will report a pain rating of 6/10 or better.  STATUS:  Met  TREATMENT:  Therapeutic exercises, manual therapy, aquatic therapy, home exercise   instruction, and modalities as needed for pain to include:  electrical stimulation, moist heat, ice,   ultrasound, and diathermy.      2. The patient demonstrates weakness of the B hip.  LTG 2: 6 weeks:  The patient will demonstrate 4+ /5 strength for B hip flexion, abduction,  and adduction in order to improve hip stability.  STATUS:  Progressing  STG 2a: 3 weeks:  The patient will demonstrate 4 /5 strength for B hip flexion, abduction,  and adduction.  STATUS:  Progressing  TREATMENT: Therapeutic exercises, manual therapy, aquatic therapy, home exercise instruction,  and modalities as needed for pain to include:  electrical stimulation, moist heat, ice, ultrasound, and   Diathermy.      3. Mobility: Walking/Moving Around Functional Limitation    LTG 3: 6 weeks:  The patient will demonstrate 10 % limitation by achieving a score of 5/50 on the INES.  STATUS:  New  STG 3 a: 4 weeks:  The patient will demonstrate 20 % limitation by achieving a score of 10/50 on the INES.    STATUS:  New  TREATMENT:  Manual therapy, therapeutic exercise, home exercise instruction, and modalities as needed to include: moist heat, electrical stimulation, and ultrasound.    Plan  Therapy options: will be seen for skilled therapy services  Planned therapy  interventions: manual therapy, strengthening, home exercise program, therapeutic activities, soft tissue mobilization and functional ROM exercises  Other planned therapy interventions: Aquatic Therapy  Frequency: 2x week  Duration in weeks: 6  Plan details: Manual therapy, therapeutic exercises, and aquatic therapy.      Progress toward previous goals: Partially Met      Recommendations: Continue as planned  Timeframe: 1 month  Prognosis to achieve goals: good    PT Signature: Darryl Hinkle PT    Electronically signed 2022    [place] License: PT - [id number]     Timed:  Manual Therapy:    0     mins  94196;  Therapeutic Exercise:    15     mins  88591;     Neuromuscular Montez:    0    mins  71953;    Therapeutic Activity:     12     mins  89147;     Gait Trainin     mins  23239;     Aquatics                         0      mins  27053    Un-timed:  Mechanical Traction      0     mins  28478  Dry Needling     0     mins self-pay  Electrical Stimulation:    0     mins  95668 ( );    Timed Treatment:   27   mins   Total Treatment:     27   mins

## 2022-04-05 ENCOUNTER — TREATMENT (OUTPATIENT)
Dept: PHYSICAL THERAPY | Facility: CLINIC | Age: 47
End: 2022-04-05

## 2022-04-05 DIAGNOSIS — M25.60 STIFFNESS IN JOINT: ICD-10-CM

## 2022-04-05 DIAGNOSIS — G89.29 CHRONIC BILATERAL LOW BACK PAIN WITH BILATERAL SCIATICA: Primary | ICD-10-CM

## 2022-04-05 DIAGNOSIS — M54.42 CHRONIC BILATERAL LOW BACK PAIN WITH BILATERAL SCIATICA: Primary | ICD-10-CM

## 2022-04-05 DIAGNOSIS — M54.41 CHRONIC BILATERAL LOW BACK PAIN WITH BILATERAL SCIATICA: Primary | ICD-10-CM

## 2022-04-05 PROCEDURE — 97113 AQUATIC THERAPY/EXERCISES: CPT | Performed by: PHYSICAL THERAPIST

## 2022-04-05 NOTE — PROGRESS NOTES
Physical Therapy Daily Progress Note    Patient: Sameer Méndez   : 1975  Diagnosis/ICD-10 Code:  Chronic bilateral low back pain with bilateral sciatica [M54.42, M54.41, G89.29]  Referring practitioner: No ref. provider found  Date of Initial Visit: Type: THERAPY  Noted: 2022  Today's Date: 2022  Patient seen for 10 sessions           Subjective   The patient reported that his pain level today is a 4/10. He was a little sore after trying land based therapy last session.     Objective   See Exercise, Manual, and Modality Logs for complete treatment.     Assessment/Plan  The patient demonstrated good tolerance to all aquatic therapy interventions today. He will return for land based therapy later in the week.       Timed:  Manual Therapy:    0     mins  06791;  Therapeutic Exercise:    0     mins  47169;     Neuromuscular Montez:   0    mins  15826;    Therapeutic Activity:     0     mins  69870;     Gait Trainin     mins  85879;     Aquatics                         38      mins  45606    Un-timed:  Mechanical Traction      0     mins  85487  Dry Needling     0     mins self-pay  Electrical Stimulation:    0     mins  54717 ( );      Timed Treatment:   38   mins   Total Treatment:     38   mins    Darryl Hinkle PT  Physical Therapist    Electronically signed 2022    [place] License: PT - [id number]

## 2022-04-08 ENCOUNTER — TREATMENT (OUTPATIENT)
Dept: PHYSICAL THERAPY | Facility: CLINIC | Age: 47
End: 2022-04-08

## 2022-04-08 ENCOUNTER — TELEPHONE (OUTPATIENT)
Dept: FAMILY MEDICINE CLINIC | Facility: CLINIC | Age: 47
End: 2022-04-08

## 2022-04-08 DIAGNOSIS — G89.29 CHRONIC LOW BACK PAIN, UNSPECIFIED BACK PAIN LATERALITY, UNSPECIFIED WHETHER SCIATICA PRESENT: Primary | ICD-10-CM

## 2022-04-08 DIAGNOSIS — M25.60 STIFFNESS IN JOINT: ICD-10-CM

## 2022-04-08 DIAGNOSIS — M54.41 CHRONIC BILATERAL LOW BACK PAIN WITH BILATERAL SCIATICA: Primary | ICD-10-CM

## 2022-04-08 DIAGNOSIS — E29.1 HYPOGONADISM IN MALE: ICD-10-CM

## 2022-04-08 DIAGNOSIS — G89.29 CHRONIC BILATERAL LOW BACK PAIN WITH BILATERAL SCIATICA: Primary | ICD-10-CM

## 2022-04-08 DIAGNOSIS — M54.50 CHRONIC LOW BACK PAIN, UNSPECIFIED BACK PAIN LATERALITY, UNSPECIFIED WHETHER SCIATICA PRESENT: Primary | ICD-10-CM

## 2022-04-08 DIAGNOSIS — M54.42 CHRONIC BILATERAL LOW BACK PAIN WITH BILATERAL SCIATICA: Primary | ICD-10-CM

## 2022-04-08 PROCEDURE — 97530 THERAPEUTIC ACTIVITIES: CPT | Performed by: PHYSICAL THERAPIST

## 2022-04-08 PROCEDURE — 97110 THERAPEUTIC EXERCISES: CPT | Performed by: PHYSICAL THERAPIST

## 2022-04-08 NOTE — TELEPHONE ENCOUNTER
Caller: Sameer Méndez    Relationship: Self    Best call back number: [phone]    What orders are you requesting (i.e. lab or imaging): STAIR LIFT    Additional notes:ANDREW STATED: SPOKE WITH INSURANCE AND THEY INFORMED THAT HE QUALIFIES, THAT THE INSURANCE COMPANY WILL ARRANGE INSTALLATION WITH ORDER FROM PCP

## 2022-04-08 NOTE — TELEPHONE ENCOUNTER
Caller: Sameer Méndez    Relationship: Self    Best call back number:   What medication are you requesting: TESTOSTERONE  INJECTIONS  025         Have you had these symptoms before:    [x] Yes  [] No    Have you been treated for these symptoms before:   [x] Yes  [] No    If a prescription is needed, what is your preferred pharmacy and phone number: [id number] - [place], [place] - [address] AT [facility] [phone] [facility] [phone]

## 2022-04-08 NOTE — PROGRESS NOTES
Physical Therapy Daily Treatment Note      Patient: Sameer Méndez   : 1975  Referring practitioner: No ref. provider found  Date of Initial Visit: Type: THERAPY  Noted: 2022  Today's Date: 2022  Patient seen for 11 sessions           Subjective Questionnaire:       Subjective Evaluation    History of Present Illness    Subjective comment: Pt reports his back was really stiff this morning and is every morning. Pt reports he has to get in the shower and let the water run and move around.       Objective   See Exercise, Manual, and Modality Logs for complete treatment.       Assessment & Plan     Assessment    Assessment details: Pt reported stiffness with therapeutic exercise stating it is hard to move stating it's not pain.  Continue with POC.        Visit Diagnoses:    ICD-10-CM ICD-9-CM   1. Chronic bilateral low back pain with bilateral sciatica  M54.42 724.2    M54.41 724.3    G89.29 338.29   2. Stiffness in joint  M25.60 719.50       Progress per Plan of Care and Progress strengthening /stabilization /functional activity           Timed:  Manual Therapy:         mins  24067;  Therapeutic Exercise:    19     mins  52148;     Neuromuscular Montez:        mins  96248;    Therapeutic Activity:     8     mins  57565;     Gait Training:           mins  59929;     Ultrasound:          mins  35599;    Electrical Stimulation:         mins  49567 ( );  Aquatic Therapy          mins  17339    Untimed:  Electrical Stimulation:         mins  44756 ( );  Mechanical Traction:         mins  23429;     Timed Treatment:   30   mins   Total Treatment:     30   mins    Electronically signed    Kristel Chairez PTA  Physical Therapist Assistant    [place] license: [id number]

## 2022-04-11 ENCOUNTER — TREATMENT (OUTPATIENT)
Dept: PHYSICAL THERAPY | Facility: CLINIC | Age: 47
End: 2022-04-11

## 2022-04-11 DIAGNOSIS — M54.41 CHRONIC BILATERAL LOW BACK PAIN WITH BILATERAL SCIATICA: Primary | ICD-10-CM

## 2022-04-11 DIAGNOSIS — M54.42 CHRONIC BILATERAL LOW BACK PAIN WITH BILATERAL SCIATICA: Primary | ICD-10-CM

## 2022-04-11 DIAGNOSIS — M25.60 STIFFNESS IN JOINT: ICD-10-CM

## 2022-04-11 DIAGNOSIS — G89.29 CHRONIC BILATERAL LOW BACK PAIN WITH BILATERAL SCIATICA: Primary | ICD-10-CM

## 2022-04-11 PROCEDURE — 97113 AQUATIC THERAPY/EXERCISES: CPT | Performed by: PHYSICAL THERAPIST

## 2022-04-11 NOTE — TELEPHONE ENCOUNTER
Pt wants to have the lab checked to see where he is to determine if he needs the injection.     He also needs the order for a stair lift. His insurance will approve if he has a RX. He will find out where we need to send the RX and let us know.     Please see pended orders

## 2022-04-11 NOTE — PROGRESS NOTES
Physical Therapy Daily Treatment Note      Patient: Sameer Méndez   : 1975  Referring practitioner: No ref. provider found  Date of Initial Visit: Type: THERAPY  Noted: 2022  Today's Date: 2022  Patient seen for 12 sessions           Subjective Questionnaire:       Subjective Evaluation    History of Present Illness    Subjective comment: Pt reports back went out a couple of weeks ago and it is still agitated.  Pt rports pain at R back near scapula and goes around anteriorly.Pain  Current pain ratin           Objective   See Exercise, Manual, and Modality Logs for complete treatment.       Assessment & Plan     Assessment    Assessment details: Pt continues t report neck and back pain asking to be in water to his neck.  Pt tolerates aquatics well without increased pain.        Visit Diagnoses:    ICD-10-CM ICD-9-CM   1. Chronic bilateral low back pain with bilateral sciatica  M54.42 724.2    M54.41 724.3    G89.29 338.29   2. Stiffness in joint  M25.60 719.50       Progress per Plan of Care and Progress strengthening /stabilization /functional activity            Timed:  Manual Therapy:         mins  58979;  Therapeutic Exercise:         mins  32914;     Neuromuscular Montez:        mins  71630;    Therapeutic Activity:          mins  99570;     Gait Training:           mins  31537;     Ultrasound:          mins  59007;    Electrical Stimulation:         mins  55085 ( );  Aquatic Therapy     29     mins  72362    Untimed:  Electrical Stimulation:         mins  83068 ( );  Mechanical Traction:         mins  05413;     Timed Treatment:   28   mins   Total Treatment:     28   mins    Electronically signed    Kristel Chairez PTA  Physical Therapist Assistant    [place] license: [id number]

## 2022-04-12 ENCOUNTER — LAB (OUTPATIENT)
Dept: LAB | Facility: HOSPITAL | Age: 47
End: 2022-04-12

## 2022-04-12 DIAGNOSIS — E29.1 HYPOGONADISM IN MALE: ICD-10-CM

## 2022-04-12 LAB — TESTOST SERPL-MCNC: 205 NG/DL (ref 249–836)

## 2022-04-12 PROCEDURE — 36415 COLL VENOUS BLD VENIPUNCTURE: CPT

## 2022-04-12 PROCEDURE — 84403 ASSAY OF TOTAL TESTOSTERONE: CPT

## 2022-04-13 ENCOUNTER — TELEPHONE (OUTPATIENT)
Dept: FAMILY MEDICINE CLINIC | Facility: CLINIC | Age: 47
End: 2022-04-13

## 2022-04-13 NOTE — TELEPHONE ENCOUNTER
----- Message from STEFFANIE Schmitt sent at 4/13/2022 11:38 AM EDT -----  Please let patient know that his testosterone levels are still low at 205.  If you would like to start testosterone therapy we need to ensure that he has a drug screen and a consent updated.  If patient is not symptomatic and he wants to hold off then we can continue to monitor

## 2022-04-19 ENCOUNTER — CLINICAL SUPPORT (OUTPATIENT)
Dept: FAMILY MEDICINE CLINIC | Facility: CLINIC | Age: 47
End: 2022-04-19

## 2022-04-19 DIAGNOSIS — Z79.899 MEDICATION MANAGEMENT: Primary | ICD-10-CM

## 2022-04-19 PROCEDURE — 80305 DRUG TEST PRSMV DIR OPT OBS: CPT | Performed by: NURSE PRACTITIONER

## 2022-04-21 ENCOUNTER — TELEPHONE (OUTPATIENT)
Dept: FAMILY MEDICINE CLINIC | Facility: CLINIC | Age: 47
End: 2022-04-21

## 2022-04-22 DIAGNOSIS — E29.1 HYPOGONADISM IN MALE: Primary | ICD-10-CM

## 2022-04-22 RX ORDER — TESTOSTERONE CYPIONATE 100 MG/ML
100 INJECTION, SOLUTION INTRAMUSCULAR
Qty: 2 ML | Refills: 0 | Status: SHIPPED | OUTPATIENT
Start: 2022-04-22 | End: 2022-04-27

## 2022-04-22 RX ORDER — SYRINGE W-NEEDLE,DISPOSAB,3 ML 25GX5/8"
1 SYRINGE, EMPTY DISPOSABLE MISCELLANEOUS
Qty: 50 EACH | Refills: 0 | Status: SHIPPED | OUTPATIENT
Start: 2022-04-22 | End: 2022-08-24

## 2022-04-27 ENCOUNTER — CLINICAL SUPPORT (OUTPATIENT)
Dept: FAMILY MEDICINE CLINIC | Facility: CLINIC | Age: 47
End: 2022-04-27

## 2022-04-27 DIAGNOSIS — E29.1 HYPOGONADISM IN MALE: ICD-10-CM

## 2022-04-27 PROCEDURE — 96372 THER/PROPH/DIAG INJ SC/IM: CPT | Performed by: NURSE PRACTITIONER

## 2022-04-27 RX ORDER — TESTOSTERONE CYPIONATE 100 MG/ML
100 INJECTION, SOLUTION INTRAMUSCULAR ONCE
Status: COMPLETED | OUTPATIENT
Start: 2022-04-27 | End: 2022-04-27

## 2022-04-27 RX ORDER — TESTOSTERONE CYPIONATE 200 MG/ML
100 INJECTION, SOLUTION INTRAMUSCULAR ONCE
Status: CANCELLED | OUTPATIENT
Start: 2022-04-27 | End: 2022-04-27

## 2022-04-27 RX ADMIN — TESTOSTERONE CYPIONATE 100 MG: 100 INJECTION, SOLUTION INTRAMUSCULAR at 15:59

## 2022-05-20 RX ORDER — LINACLOTIDE 145 UG/1
CAPSULE, GELATIN COATED ORAL
Qty: 90 CAPSULE | Refills: 0 | Status: SHIPPED | OUTPATIENT
Start: 2022-05-20 | End: 2022-08-17

## 2022-06-02 ENCOUNTER — TELEPHONE (OUTPATIENT)
Dept: FAMILY MEDICINE CLINIC | Facility: CLINIC | Age: 47
End: 2022-06-02

## 2022-06-02 DIAGNOSIS — E29.1 HYPOGONADISM IN MALE: Primary | ICD-10-CM

## 2022-06-02 NOTE — TELEPHONE ENCOUNTER
Caller: Sameer Méndez    Relationship to patient: Self    Best call back number: [phone]    Patient is needing: PATIENT STATED HE NEEDS A REFILL ON HIS TESTOSTERONE INJECTIONS SENT TO:     [id number] - [place], [place] - [address] AT [facility] - [phone] [facility] [phone]   [phone]

## 2022-06-03 RX ORDER — TESTOSTERONE ENANTHATE 200 MG/ML
0.5 INJECTION, SOLUTION INTRAMUSCULAR
Qty: 2 ML | Refills: 0 | Status: SHIPPED | OUTPATIENT
Start: 2022-06-03 | End: 2022-08-24 | Stop reason: ALTCHOICE

## 2022-06-03 RX ORDER — TESTOSTERONE CYPIONATE 100 MG/ML
100 INJECTION, SOLUTION INTRAMUSCULAR ONCE
Status: DISCONTINUED | OUTPATIENT
Start: 2022-06-03 | End: 2022-06-03

## 2022-07-21 DIAGNOSIS — E11.40 TYPE 2 DIABETES MELLITUS WITH DIABETIC NEUROPATHY, WITHOUT LONG-TERM CURRENT USE OF INSULIN: ICD-10-CM

## 2022-07-21 NOTE — TELEPHONE ENCOUNTER
Caller: Sameer Méndez    Relationship: Self    Best call back number: [phone]    Requested Prescriptions:   Requested Prescriptions     Pending Prescriptions Disp Refills   • Semaglutide,0.25 or 0.5MG/DOS, (OZEMPIC) 2 MG/1.5ML solution pen-injector 1 pen 0     Sig: Inject 0.5 mg under the skin into the appropriate area as directed 1 (One) Time Per Week.      PEN NEEDLES  0.25 X 8 MM       Pharmacy where request should be sent: [id number] [place] - [address] [facility] AT [facility] [phone] [facility] [phone] FX     Additional details provided by patient: PATIENT CALLED STATING HE IS NEEDING THE PEN NEEDLES TO BE A 3 MONTH SUPPLY. PLEASE ADVISE THANK YOU.     Does the patient have less than a 3 day supply:  [x] Yes  [] No    Rishi Pian Rep   07/21/22 11:18 EDT

## 2022-08-17 RX ORDER — LINACLOTIDE 145 UG/1
CAPSULE, GELATIN COATED ORAL
Qty: 90 CAPSULE | Refills: 0 | Status: SHIPPED | OUTPATIENT
Start: 2022-08-17 | End: 2022-12-20

## 2022-08-24 ENCOUNTER — OFFICE VISIT (OUTPATIENT)
Dept: FAMILY MEDICINE CLINIC | Facility: CLINIC | Age: 47
End: 2022-08-24

## 2022-08-24 VITALS
BODY MASS INDEX: 32.01 KG/M2 | DIASTOLIC BLOOD PRESSURE: 94 MMHG | OXYGEN SATURATION: 99 % | TEMPERATURE: 97.3 F | HEIGHT: 70 IN | WEIGHT: 223.6 LBS | SYSTOLIC BLOOD PRESSURE: 158 MMHG | HEART RATE: 76 BPM

## 2022-08-24 DIAGNOSIS — L30.9 ECZEMA, UNSPECIFIED TYPE: ICD-10-CM

## 2022-08-24 DIAGNOSIS — M54.50 CHRONIC BILATERAL LOW BACK PAIN WITHOUT SCIATICA: ICD-10-CM

## 2022-08-24 DIAGNOSIS — M10.9 GOUT, UNSPECIFIED CAUSE, UNSPECIFIED CHRONICITY, UNSPECIFIED SITE: ICD-10-CM

## 2022-08-24 DIAGNOSIS — E11.40 TYPE 2 DIABETES MELLITUS WITH DIABETIC NEUROPATHY, WITHOUT LONG-TERM CURRENT USE OF INSULIN: Primary | ICD-10-CM

## 2022-08-24 DIAGNOSIS — E55.9 VITAMIN D DEFICIENCY: ICD-10-CM

## 2022-08-24 DIAGNOSIS — I10 PRIMARY HYPERTENSION: ICD-10-CM

## 2022-08-24 DIAGNOSIS — Z79.899 MEDICATION MANAGEMENT: ICD-10-CM

## 2022-08-24 DIAGNOSIS — E78.5 HYPERLIPIDEMIA, UNSPECIFIED HYPERLIPIDEMIA TYPE: ICD-10-CM

## 2022-08-24 DIAGNOSIS — G89.29 CHRONIC BILATERAL LOW BACK PAIN WITHOUT SCIATICA: ICD-10-CM

## 2022-08-24 DIAGNOSIS — Z12.11 SCREENING FOR COLON CANCER: ICD-10-CM

## 2022-08-24 DIAGNOSIS — E29.1 HYPOGONADISM IN MALE: ICD-10-CM

## 2022-08-24 LAB
EXPIRATION DATE: NORMAL
HBA1C MFR BLD: 5.1 %
Lab: NORMAL

## 2022-08-24 PROCEDURE — 3044F HG A1C LEVEL LT 7.0%: CPT | Performed by: NURSE PRACTITIONER

## 2022-08-24 PROCEDURE — 83036 HEMOGLOBIN GLYCOSYLATED A1C: CPT | Performed by: NURSE PRACTITIONER

## 2022-08-24 PROCEDURE — 99214 OFFICE O/P EST MOD 30 MIN: CPT | Performed by: NURSE PRACTITIONER

## 2022-08-24 RX ORDER — TRIAMCINOLONE ACETONIDE 1 MG/G
1 CREAM TOPICAL 2 TIMES DAILY
Qty: 45 G | Refills: 1 | Status: SHIPPED | OUTPATIENT
Start: 2022-08-24 | End: 2022-09-19 | Stop reason: SDUPTHER

## 2022-08-24 RX ORDER — AMLODIPINE BESYLATE 10 MG/1
10 TABLET ORAL DAILY
Qty: 90 TABLET | Refills: 1 | Status: SHIPPED | OUTPATIENT
Start: 2022-08-24 | End: 2022-12-07

## 2022-08-24 RX ORDER — NIFEDIPINE 30 MG/1
30 TABLET, EXTENDED RELEASE ORAL DAILY
COMMUNITY
End: 2022-08-24 | Stop reason: ALTCHOICE

## 2022-08-24 RX ORDER — COLCHICINE 0.6 MG/1
0.6 TABLET ORAL DAILY PRN
COMMUNITY

## 2022-08-24 RX ORDER — TIZANIDINE HYDROCHLORIDE 4 MG/1
4 CAPSULE, GELATIN COATED ORAL 3 TIMES DAILY PRN
Qty: 90 CAPSULE | Refills: 1 | Status: SHIPPED | OUTPATIENT
Start: 2022-08-24

## 2022-08-24 NOTE — PROGRESS NOTES
"Chief Complaint  Hypertension (Discuss medications)    Subjective         Sameer Méndez presents to [facility]  Patient presents to the office today for follow-up regarding his blood pressure and his medications.  Patient states that he stopped taking his diabetic medications and only taking metformin once a day.  He states that he has changed his eating habits dramatically.  I did explain that we would check his A1c in the office today.  [facility] also [facility] to nifedipine.  He states that the medication is not working as effectively as amlodipine was and his blood pressure has been elevated.  Blood pressure today is 158/94.  He denies any chest pain shortness breath palpitations at this time.  Patient also states that he is no longer taking testosterone injections.  He states that his libido is good and he is not having any difficulties with erections.  I explained to the patient that since he is not having problems that there is no need for him to take the medication.  Patient also request triamcinolone as he has not had this before for his eczema.       Objective     Vitals:    08/24/22 0910   BP: 158/94   BP Location: Right arm   Patient Position: Sitting   Cuff Size: Adult   Pulse: 76   Temp: 97.3 °F (36.3 °C)   TempSrc: Temporal   SpO2: 99%   Weight: 101 kg (223 lb 9.6 oz)   Height: 177.8 cm (70\")      Body mass index is 32.08 kg/m².    BMI is >= 30 and <35. (Class 1 Obesity). The following options were offered after discussion;: nutrition counseling/recommendations        Physical Exam  Vitals reviewed.   Constitutional:       Appearance: Normal appearance.   HENT:      Head: Normocephalic and atraumatic.      Right Ear: Tympanic membrane, ear canal and external ear normal.      Left Ear: Tympanic membrane, ear canal and external ear normal.      Nose: Nose normal.      Mouth/Throat:      Mouth: Mucous " membranes are moist.      Pharynx: Oropharynx is clear.   Eyes:      Extraocular Movements: Extraocular movements intact.      Conjunctiva/sclera: Conjunctivae normal.      Pupils: Pupils are equal, round, and reactive to light.   Cardiovascular:      Rate and Rhythm: Normal rate and regular rhythm.      Pulses: Normal pulses.      Heart sounds: Normal heart sounds.   Pulmonary:      Effort: Pulmonary effort is normal.      Breath sounds: Normal breath sounds.   Abdominal:      General: Abdomen is flat. Bowel sounds are normal.      Palpations: Abdomen is soft.   Musculoskeletal:         General: Normal range of motion.      Cervical back: Normal range of motion and neck supple.   Skin:     General: Skin is warm and dry.   Neurological:      General: No focal deficit present.      Mental Status: He is alert and oriented to person, place, and time.   Psychiatric:         Mood and Affect: Mood normal.         Behavior: Behavior normal.          Result Review :   The following data was reviewed by: STEFFANIE Schmitt on 08/24/2022:        Assessment and Plan   Diagnoses and all orders for this visit:    1. Type 2 diabetes mellitus with diabetic neuropathy, without long-term current use of insulin (HCC) (Primary)  -     CBC & Differential; Future  -     Comprehensive Metabolic Panel; Future  -     Hemoglobin A1c; Future  -     Lipid Panel; Future  -     MicroAlbumin, Urine, Random - Urine, Clean Catch; Future  -     TSH; Future  -     POC Glycosylated Hemoglobin (Hb A1C)    2. Hypogonadism in male  -     Testosterone; Future    3. Medication management  -     CBC & Differential; Future  -     Comprehensive Metabolic Panel; Future  -     Hemoglobin A1c; Future  -     Lipid Panel; Future  -     MicroAlbumin, Urine, Random - Urine, Clean Catch; Future  -     TSH; Future  -     Vitamin D 25 hydroxy; Future    4. Primary hypertension  -     CBC & Differential; Future  -     Comprehensive Metabolic Panel; Future  -     Lipid  Panel; Future  -     amLODIPine (NORVASC) 10 MG tablet; Take 1 tablet by mouth Daily.  Dispense: 90 tablet; Refill: 1    5. Hyperlipidemia, unspecified hyperlipidemia type  -     CBC & Differential; Future  -     Comprehensive Metabolic Panel; Future  -     Lipid Panel; Future    6. Vitamin D deficiency  -     Vitamin D 25 hydroxy; Future    7. Screening for colon cancer  -     Ambulatory Referral to General Surgery    8. Chronic bilateral low back pain without sciatica  -     TiZANidine (ZANAFLEX) 4 MG capsule; Take 1 capsule by mouth 3 (Three) Times a Day As Needed for Muscle Spasms.  Dispense: 90 capsule; Refill: 1    9. Eczema, unspecified type  -     triamcinolone (KENALOG) 0.1 % cream; Apply 1 application topically to the appropriate area as directed 2 (Two) Times a Day.  Dispense: 45 g; Refill: 1    10. Gout, unspecified cause, unspecified chronicity, unspecified site      Patient's A1c was 5.1%.  I am going to have the patient hold his metformin and then recheck his A1c in 3 months.    Follow Up   Return in about 6 months (around 2/24/2023) for Recheck.  Patient was given instructions and counseling regarding his condition or for health maintenance advice. Please see specific information pulled into the AVS if appropriate.

## 2022-09-18 DIAGNOSIS — E11.40 TYPE 2 DIABETES MELLITUS WITH DIABETIC NEUROPATHY, WITHOUT LONG-TERM CURRENT USE OF INSULIN: ICD-10-CM

## 2022-09-19 ENCOUNTER — OFFICE VISIT (OUTPATIENT)
Dept: FAMILY MEDICINE CLINIC | Facility: CLINIC | Age: 47
End: 2022-09-19

## 2022-09-19 VITALS
HEIGHT: 70 IN | TEMPERATURE: 97.2 F | WEIGHT: 222.4 LBS | DIASTOLIC BLOOD PRESSURE: 92 MMHG | SYSTOLIC BLOOD PRESSURE: 156 MMHG | BODY MASS INDEX: 31.84 KG/M2 | OXYGEN SATURATION: 99 % | HEART RATE: 72 BPM

## 2022-09-19 DIAGNOSIS — I10 PRIMARY HYPERTENSION: ICD-10-CM

## 2022-09-19 DIAGNOSIS — E11.40 TYPE 2 DIABETES MELLITUS WITH DIABETIC NEUROPATHY, WITHOUT LONG-TERM CURRENT USE OF INSULIN: Primary | ICD-10-CM

## 2022-09-19 DIAGNOSIS — G89.29 CHRONIC BILATERAL LOW BACK PAIN WITH RIGHT-SIDED SCIATICA: ICD-10-CM

## 2022-09-19 DIAGNOSIS — E78.5 HYPERLIPIDEMIA, UNSPECIFIED HYPERLIPIDEMIA TYPE: ICD-10-CM

## 2022-09-19 DIAGNOSIS — L30.9 ECZEMA, UNSPECIFIED TYPE: ICD-10-CM

## 2022-09-19 DIAGNOSIS — M54.41 CHRONIC BILATERAL LOW BACK PAIN WITH RIGHT-SIDED SCIATICA: ICD-10-CM

## 2022-09-19 LAB
EXPIRATION DATE: NORMAL
HBA1C MFR BLD: 5.2 %
Lab: NORMAL

## 2022-09-19 PROCEDURE — 99213 OFFICE O/P EST LOW 20 MIN: CPT | Performed by: NURSE PRACTITIONER

## 2022-09-19 PROCEDURE — 3044F HG A1C LEVEL LT 7.0%: CPT | Performed by: NURSE PRACTITIONER

## 2022-09-19 PROCEDURE — 83036 HEMOGLOBIN GLYCOSYLATED A1C: CPT | Performed by: NURSE PRACTITIONER

## 2022-09-19 RX ORDER — SEMAGLUTIDE 1.34 MG/ML
INJECTION, SOLUTION SUBCUTANEOUS
Qty: 4.5 ML | Refills: 1 | Status: SHIPPED | OUTPATIENT
Start: 2022-09-19 | End: 2022-12-22

## 2022-09-19 RX ORDER — TESTOSTERONE CYPIONATE 200 MG/ML
INJECTION, SOLUTION INTRAMUSCULAR
COMMUNITY
End: 2022-12-22

## 2022-09-19 RX ORDER — BACLOFEN 10 MG/1
10 TABLET ORAL 2 TIMES DAILY PRN
COMMUNITY

## 2022-09-19 RX ORDER — TRIAMCINOLONE ACETONIDE 1 MG/G
1 CREAM TOPICAL 2 TIMES DAILY
Qty: 45 G | Refills: 1 | Status: SHIPPED | OUTPATIENT
Start: 2022-09-19 | End: 2023-01-13 | Stop reason: SDUPTHER

## 2022-09-19 NOTE — PROGRESS NOTES
"Chief Complaint  Back Pain    Subjective         Sameer Méndez presents to [facility]  Presents to the office today to discuss his low back pain.  He states that he does continue to see [facility] pain.  His next appointment is Wednesday.  Patient is curious to see if to get a referral for massage therapy.  States that the muscle relaxer seem to help somewhat but he would like to see if massage therapy would be beneficial.  He states the pain does radiate from his right side around to his right leg down to his right foot.  He does have some numbness in his left leg as well.  Denies any urinary or bowel incontinence.  She does request a refill of his triamcinolone.       Objective     Vitals:    09/19/22 0728 09/19/22 0806   BP: 146/98 156/92   BP Location: Left arm Left arm   Patient Position: Sitting Sitting   Cuff Size: Adult Adult   Pulse: 72    Temp: 97.2 °F (36.2 °C)    TempSrc: Temporal    SpO2: 99%    Weight: 101 kg (222 lb 6.4 oz)    Height: 177.8 cm (70\")       Body mass index is 31.91 kg/m².    BMI is >= 30 and <35. (Class 1 Obesity). The following options were offered after discussion;: nutrition counseling/recommendations        Physical Exam  Vitals reviewed.   Constitutional:       Appearance: Normal appearance.   Cardiovascular:      Rate and Rhythm: Normal rate and regular rhythm.      Pulses: Normal pulses.      Heart sounds: Normal heart sounds, S1 normal and S2 normal. No murmur heard.  Pulmonary:      Effort: Pulmonary effort is normal. No respiratory distress.      Breath sounds: Normal breath sounds.   Musculoskeletal:      Lumbar back: Tenderness present.        Back:    Skin:     General: Skin is warm and dry.   Neurological:      Mental Status: He is alert and oriented to person, place, and time.   Psychiatric:         Attention and Perception: Attention normal.         Mood and Affect: Mood normal.         Behavior: Behavior normal.          Result " Review :   The following data was reviewed by: STEFFANIE Schmitt on 09/19/2022:      Procedures    Assessment and Plan   Diagnoses and all orders for this visit:    1. Type 2 diabetes mellitus with diabetic neuropathy, without long-term current use of insulin (HCC) (Primary)  -     POC Glycosylated Hemoglobin (Hb A1C)  -     CBC (No Diff); Future    2. Hyperlipidemia, unspecified hyperlipidemia type  -     CBC (No Diff); Future    3. Primary hypertension  -     CBC (No Diff); Future    4. Eczema, unspecified type  -     triamcinolone (KENALOG) 0.1 % cream; Apply 1 application topically to the appropriate area as directed 2 (Two) Times a Day.  Dispense: 45 g; Refill: 1    5. Chronic bilateral low back pain with right-sided sciatica  -     Ambulatory Referral to Massage Therapy          Follow Up   Return in about 6 months (around 3/19/2023) for Recheck.  Patient was given instructions and counseling regarding his condition or for health maintenance advice. Please see specific information pulled into the AVS if appropriate.

## 2022-09-24 DIAGNOSIS — E78.5 HYPERLIPIDEMIA, UNSPECIFIED HYPERLIPIDEMIA TYPE: ICD-10-CM

## 2022-09-26 RX ORDER — PRAVASTATIN SODIUM 80 MG/1
80 TABLET ORAL NIGHTLY
Qty: 90 TABLET | Refills: 1 | Status: SHIPPED | OUTPATIENT
Start: 2022-09-26 | End: 2023-03-07

## 2022-10-18 ENCOUNTER — OFFICE VISIT (OUTPATIENT)
Dept: SURGERY | Facility: CLINIC | Age: 47
End: 2022-10-18

## 2022-10-18 ENCOUNTER — PREP FOR SURGERY (OUTPATIENT)
Dept: OTHER | Facility: HOSPITAL | Age: 47
End: 2022-10-18

## 2022-10-18 VITALS — HEIGHT: 70 IN | BODY MASS INDEX: 32.5 KG/M2 | HEART RATE: 64 BPM | WEIGHT: 227 LBS

## 2022-10-18 DIAGNOSIS — Z12.11 SCREENING FOR MALIGNANT NEOPLASM OF COLON: Primary | ICD-10-CM

## 2022-10-18 PROCEDURE — S0260 H&P FOR SURGERY: HCPCS | Performed by: NURSE PRACTITIONER

## 2022-10-18 RX ORDER — SOD SULF/POT CHLORIDE/MAG SULF 1.479 G
24 TABLET ORAL ONCE
Qty: 24 TABLET | Refills: 0 | Status: SHIPPED | OUTPATIENT
Start: 2022-10-18 | End: 2022-10-18

## 2022-10-18 NOTE — PROGRESS NOTES
Chief Complaint: Colonoscopy (consult)    Subjective      Colonoscopy consultation       History of Present Illness  Sameer Méndez is a 47 y.o. male presents to [facility] for colonoscopy consultation    Patient presents today on referral from Tre Mattson for colonoscopy consultation.    Patient denies any abdominal pain, change in bowel habit, or rectal bleeding.    Denies any family history of colorectal cancer.    No previous colonoscopy.    Objective     Past Medical History:   Diagnosis Date   • DDD (degenerative disc disease), thoracolumbar    • Gout    • Hyperlipidemia    • Hypertension        History reviewed. No pertinent surgical history.    Outpatient Medications Marked as Taking for the 10/18/22 encounter (Office Visit) with Dionicio April, APRN   Medication Sig Dispense Refill   • albuterol sulfate  (90 Base) MCG/ACT inhaler Inhale 2 puffs Every 4 (Four) Hours As Needed for Wheezing. 8 g 1   • allopurinol (ZYLOPRIM) 100 MG tablet Take 100 mg by mouth Daily.     • amLODIPine (NORVASC) 10 MG tablet Take 1 tablet by mouth Daily. 90 tablet 1   • Cholecalciferol 25 MCG (1000 UT) capsule Take 2,000 Units by mouth Daily.     • colchicine 0.6 MG tablet Take 0.6 mg by mouth Daily.     • DULoxetine HCl 40 MG capsule delayed-release particles TAKE 1 CAPSULE(40 MG) BY MOUTH EVERY DAY 90 capsule 1   • Linzess 145 MCG capsule capsule TAKE 1 CAPSULE(145 MCG) BY MOUTH EVERY DAY 30 MINUTES BEFORE FIRST MEAL OF THE DAY ON AN EMPTY STOMACH 90 capsule 0   • losartan (COZAAR) 100 MG tablet Take 100 mg by mouth Daily.     • melatonin 3-10 MG tablet tablet Take 1 tablet by mouth At Night As Needed.     • metoprolol succinate XL (TOPROL-XL) 25 MG 24 hr tablet TAKE 1 TABLET BY MOUTH EVERY DAY (Patient taking differently: Take 4 tablets by mouth Daily.) 90 tablet 1   • ondansetron ODT (ZOFRAN-ODT) 4 MG disintegrating tablet Place 1 tablet on the tongue Every 8 (Eight) Hours As  "Needed for Nausea or Vomiting. 15 tablet 0   • pravastatin (PRAVACHOL) 80 MG tablet TAKE 1 TABLET BY MOUTH EVERY NIGHT 90 tablet 1   • TiZANidine (ZANAFLEX) 4 MG capsule Take 1 capsule by mouth 3 (Three) Times a Day As Needed for Muscle Spasms. 90 capsule 1   • triamcinolone (KENALOG) 0.1 % cream Apply 1 application topically to the appropriate area as directed 2 (Two) Times a Day. 45 g 1       Allergies   Allergen Reactions   • Ibuprofen GI Intolerance   • Penicillins Rash   • Viagra [Sildenafil] Other (See Comments)     BODYACHES        Family History   Problem Relation Age of Onset   • Diabetes Mother        Social History     Socioeconomic History   • Marital status:    Tobacco Use   • Smoking status: Never   • Smokeless tobacco: Never   Vaping Use   • Vaping Use: Never used   Substance and Sexual Activity   • Alcohol use: Not Currently   • Drug use: Never   • Sexual activity: Defer       Review of Systems   Constitutional: Negative for chills and fever.   Gastrointestinal: Negative for abdominal distention, abdominal pain, anal bleeding, blood in stool, constipation, diarrhea and rectal pain.        Vital Signs:   Pulse 64   Ht 177.8 cm (70\")   Wt 103 kg (227 lb)   BMI 32.57 kg/m²      Physical Exam  Vitals and nursing note reviewed.   Constitutional:       General: He is not in acute distress.     Appearance: Normal appearance.   HENT:      Head: Normocephalic.   Cardiovascular:      Rate and Rhythm: Normal rate.   Pulmonary:      Effort: Pulmonary effort is normal.   Abdominal:      Palpations: Abdomen is soft.      Tenderness: There is no guarding.   Musculoskeletal:         General: No deformity. Normal range of motion.   Skin:     General: Skin is warm and dry.      Coloration: Skin is not jaundiced.   Neurological:      General: No focal deficit present.      Mental Status: He is alert and oriented to person, place, and time.   Psychiatric:         Mood and Affect: Mood normal.         Thought " Content: Thought content normal.          Result Review :          []  Laboratory  []  Radiology  []  Pathology  []  Microbiology  []  EKG/Telemetry   []  Cardiology/Vascular   [x]  Old records  I reviewed Tre Mattson's previous office note.     Assessment and Plan    Diagnoses and all orders for this visit:    1. Screening for malignant neoplasm of colon (Primary)    Other orders  -     Sodium Sulfate-Mag Sulfate-KCl (Sutab) 8157-741-402 MG tablet; Take 24 tablets by mouth 1 (One) Time for 1 dose. Take as directed. Directions given in office  Dispense: 24 tablet; Refill: 0        Follow Up   Return for Schedule colonoscopy with Dr. Leon on 12/29/2022 at [facility], [place].     Hospital arrival time: 10:00am.    Possible risks/complications, benefits, and alternatives to surgical or invasive procedure have been explained to patient and/or legal guardian.     Patient has been evaluated and can tolerate anesthesia and/or sedation. Risks, benefits, and alternatives to anesthesia and sedation have been explained to patient and/or legal guardian.  Patient verbalizes understanding and is will proceed with above plan.     Patient was given instructions and counseling regarding his condition or for health maintenance advice. Please see specific information pulled into the AVS if appropriate.     The office note was dictated with the help of the dragon dictation system.

## 2022-12-07 DIAGNOSIS — I10 PRIMARY HYPERTENSION: ICD-10-CM

## 2022-12-07 RX ORDER — AMLODIPINE BESYLATE 10 MG/1
10 TABLET ORAL DAILY
Qty: 90 TABLET | Refills: 1 | Status: SHIPPED | OUTPATIENT
Start: 2022-12-07

## 2022-12-20 RX ORDER — LINACLOTIDE 145 UG/1
CAPSULE, GELATIN COATED ORAL
Qty: 90 CAPSULE | Refills: 0 | Status: SHIPPED | OUTPATIENT
Start: 2022-12-20 | End: 2023-03-28

## 2022-12-22 ENCOUNTER — TELEPHONE (OUTPATIENT)
Dept: SURGERY | Facility: CLINIC | Age: 47
End: 2022-12-22

## 2022-12-22 NOTE — PRE-PROCEDURE INSTRUCTIONS
Patient educated on instructions of clear liquid diet.  Educated on medications to take morning of procedure.  Patient verbalizes instructions on prep for procedure. NPO after midnight morning of procedure with the exception of medications to take ( 2 hours prior to arrival time) with water only and finishing bowel prep. Patient instructions to look for clear to pale yellow liquid stool prior to procedure.  Patient given PAT direct number for any concerns ([phone]).

## 2022-12-22 NOTE — PRE-PROCEDURE INSTRUCTIONS
Gladis at [facility] made aware of patient having an issue with obtaining prep due to insurance not covering the prep at [facility]

## 2022-12-22 NOTE — TELEPHONE ENCOUNTER
I spoke with patient and gave him prep instructions for Miralax since his insurance will not cover Suprep. Patient verbalized understanding.

## 2022-12-22 NOTE — TELEPHONE ENCOUNTER
PAT called stating patient said insurance will not cover his colonoscopy prep. Can you send in something else?

## 2022-12-29 ENCOUNTER — ANESTHESIA EVENT (OUTPATIENT)
Dept: GASTROENTEROLOGY | Facility: HOSPITAL | Age: 47
End: 2022-12-29
Payer: MEDICARE

## 2022-12-29 ENCOUNTER — HOSPITAL ENCOUNTER (OUTPATIENT)
Facility: HOSPITAL | Age: 47
Setting detail: HOSPITAL OUTPATIENT SURGERY
Discharge: HOME | End: 2022-12-29
Attending: SURGERY | Admitting: SURGERY
Payer: MEDICARE

## 2022-12-29 ENCOUNTER — ANESTHESIA (OUTPATIENT)
Dept: GASTROENTEROLOGY | Facility: HOSPITAL | Age: 47
End: 2022-12-29
Payer: MEDICARE

## 2022-12-29 VITALS
TEMPERATURE: 97.6 F | WEIGHT: 241.62 LBS | OXYGEN SATURATION: 99 % | SYSTOLIC BLOOD PRESSURE: 123 MMHG | HEART RATE: 68 BPM | RESPIRATION RATE: 12 BRPM | BODY MASS INDEX: 34.59 KG/M2 | DIASTOLIC BLOOD PRESSURE: 90 MMHG | HEIGHT: 70 IN

## 2022-12-29 LAB — GLUCOSE BLDC GLUCOMTR-MCNC: 104 MG/DL (ref 70–99)

## 2022-12-29 PROCEDURE — 25010000002 PROPOFOL 10 MG/ML EMULSION: Performed by: NURSE ANESTHETIST, CERTIFIED REGISTERED

## 2022-12-29 PROCEDURE — 82962 GLUCOSE BLOOD TEST: CPT

## 2022-12-29 RX ORDER — SODIUM CHLORIDE, SODIUM LACTATE, POTASSIUM CHLORIDE, CALCIUM CHLORIDE 600; 310; 30; 20 MG/100ML; MG/100ML; MG/100ML; MG/100ML
30 INJECTION, SOLUTION INTRAVENOUS CONTINUOUS
Status: DISCONTINUED | OUTPATIENT
Start: 2022-12-29 | End: 2022-12-29 | Stop reason: HOSPADM

## 2022-12-29 RX ORDER — LIDOCAINE HYDROCHLORIDE 20 MG/ML
INJECTION, SOLUTION EPIDURAL; INFILTRATION; INTRACAUDAL; PERINEURAL AS NEEDED
Status: DISCONTINUED | OUTPATIENT
Start: 2022-12-29 | End: 2022-12-29 | Stop reason: SURG

## 2022-12-29 RX ORDER — PROPOFOL 10 MG/ML
VIAL (ML) INTRAVENOUS AS NEEDED
Status: DISCONTINUED | OUTPATIENT
Start: 2022-12-29 | End: 2022-12-29 | Stop reason: SURG

## 2022-12-29 RX ADMIN — SODIUM CHLORIDE, POTASSIUM CHLORIDE, SODIUM LACTATE AND CALCIUM CHLORIDE 30 ML/HR: 600; 310; 30; 20 INJECTION, SOLUTION INTRAVENOUS at 10:45

## 2022-12-29 RX ADMIN — PROPOFOL 150 MG: 10 INJECTION, EMULSION INTRAVENOUS at 11:24

## 2022-12-29 RX ADMIN — PROPOFOL 150 MCG/KG/MIN: 10 INJECTION, EMULSION INTRAVENOUS at 11:24

## 2022-12-29 RX ADMIN — LIDOCAINE HYDROCHLORIDE 100 MG: 20 INJECTION, SOLUTION EPIDURAL; INFILTRATION; INTRACAUDAL; PERINEURAL at 11:24

## 2022-12-29 NOTE — ANESTHESIA PREPROCEDURE EVALUATION
Anesthesia Evaluation     Patient summary reviewed and Nursing notes reviewed   no history of anesthetic complications:  NPO Solid Status: > 8 hours  NPO Liquid Status: > 2 hours           Airway   Mallampati: I  TM distance: >3 FB  Neck ROM: full  No difficulty expected  Dental      Pulmonary - negative pulmonary ROS and normal exam    breath sounds clear to auscultation  Cardiovascular - normal exam  Exercise tolerance: good (4-7 METS)    Rhythm: regular  Rate: normal    (+) hypertension well controlled, hyperlipidemia,       Neuro/Psych- negative ROS  GI/Hepatic/Renal/Endo - negative ROS     Musculoskeletal     (+) neck pain,   Abdominal    Substance History - negative use     OB/GYN negative ob/gyn ROS         Other   arthritis,      ROS/Med Hx Other: PAT Nursing Notes unavailable.                   Anesthesia Plan    ASA 2     general     (Patient understands anesthesia not responsible for dental damage.)  intravenous induction     Anesthetic plan, risks, benefits, and alternatives have been provided, discussed and informed consent has been obtained with: patient.  Pre-procedure education provided  Plan discussed with CRNA.        CODE STATUS:

## 2022-12-29 NOTE — ANESTHESIA POSTPROCEDURE EVALUATION
Patient: Sameer Méndez    Procedure Summary     Date: 12/29/22 Room / Location: [facility] ENDOSCOPY 1 / [facility] ENDOSCOPY    Anesthesia Start: 1121 Anesthesia Stop: 1137    Procedure: COLONOSCOPY Diagnosis:       Screening for malignant neoplasm of colon      (Screening for malignant neoplasm of colon [Z12.11])    Surgeons: Juancho Leon MD Provider: Lanre Alas MD    Anesthesia Type: general ASA Status: 2          Anesthesia Type: general    Vitals  Vitals Value Taken Time   /73 12/29/22 1152   Temp 36.2 °C (97.1 °F) 12/29/22 1140   Pulse 68 12/29/22 1153   Resp 15 12/29/22 1150   SpO2 97 % 12/29/22 1153   Vitals shown include unvalidated device data.        Post Anesthesia Care and Evaluation    Patient location during evaluation: bedside  Patient participation: complete - patient participated  Level of consciousness: awake  Pain management: adequate    Airway patency: patent  Anesthetic complications: No anesthetic complications  PONV Status: none  Cardiovascular status: acceptable and stable  Respiratory status: acceptable  Hydration status: acceptable    Comments: An Anesthesiologist personally participated in the most demanding procedures (including induction and emergence if applicable) in the anesthesia plan, monitored the course of anesthesia administration at frequent intervals and remained physically present and available for immediate diagnosis and treatment of emergencies.

## 2022-12-29 NOTE — H&P
General Surgery/Colorectal Surgery Note    Patient Name:  Sameer Méndez  YOB: 1975  [id number]    Referring Provider: Juancho Leon, *      Patient Care Team:  Tre Hdz APRN as PCP - General (Nurse Practitioner)  Maine Greenberg APRN as Nurse Practitioner (Nurse Practitioner)    Subjective .     History of present illness:    HPI   referral from Tre Mattson for colonoscopy consultation.     Patient denies any abdominal pain, change in bowel habit, or rectal bleeding.     Denies any family history of colorectal cancer.     No previous colonoscopy.    History:  Past Medical History:   Diagnosis Date   • DDD (degenerative disc disease), thoracolumbar    • Gout    • Hyperlipidemia    • Hypertension        Past Surgical History:   Procedure Laterality Date   • COLONOSCOPY         Family History   Problem Relation Age of Onset   • Diabetes Mother    • Malig Hyperthermia Neg Hx        Social History     Tobacco Use   • Smoking status: Never   • Smokeless tobacco: Never   Vaping Use   • Vaping Use: Never used   Substance Use Topics   • Alcohol use: Not Currently   • Drug use: Never       Review of Systems: See HPI    Review of Systems            Medications Prior to Admission   Medication Sig Dispense Refill Last Dose   • allopurinol (ZYLOPRIM) 100 MG tablet Take 100 mg by mouth Daily.      • amLODIPine (NORVASC) 10 MG tablet TAKE 1 TABLET BY MOUTH DAILY 90 tablet 1    • baclofen (LIORESAL) 10 MG tablet Take 10 mg by mouth 2 (Two) Times a Day As Needed.      • Cholecalciferol 25 MCG (1000 UT) capsule Take 2,000 Units by mouth Daily.      • colchicine 0.6 MG tablet Take 0.6 mg by mouth Daily As Needed.      • Linzess 145 MCG capsule capsule TAKE 1 CAPSULE(145 MCG) BY MOUTH EVERY DAY 30 MINUTES BEFORE FIRST MEAL OF THE DAY ON AN EMPTY STOMACH (Patient taking differently: Take 145 mcg by mouth Daily As Needed.) 90 capsule 0    • losartan (COZAAR) 100 MG tablet Take 100 mg by mouth Daily.       • melatonin 3-10 MG tablet tablet Take 1 tablet by mouth At Night As Needed.      • metoprolol succinate XL (TOPROL-XL) 25 MG 24 hr tablet TAKE 1 TABLET BY MOUTH EVERY DAY (Patient taking differently: Take 100 mg by mouth Daily.) 90 tablet 1    • ondansetron ODT (ZOFRAN-ODT) 4 MG disintegrating tablet Place 1 tablet on the tongue Every 8 (Eight) Hours As Needed for Nausea or Vomiting. 15 tablet 0    • pravastatin (PRAVACHOL) 80 MG tablet TAKE 1 TABLET BY MOUTH EVERY NIGHT 90 tablet 1    • TiZANidine (ZANAFLEX) 4 MG capsule Take 1 capsule by mouth 3 (Three) Times a Day As Needed for Muscle Spasms. 90 capsule 1    • triamcinolone (KENALOG) 0.1 % cream Apply 1 application topically to the appropriate area as directed 2 (Two) Times a Day. 45 g 1          Home Meds:      Prior to Admission medications    Medication Sig Start Date End Date Taking? Authorizing Provider   allopurinol (ZYLOPRIM) 100 MG tablet Take 100 mg by mouth Daily.   Yes Emergency, Nurse SHAUN Cruz   amLODIPine (NORVASC) 10 MG tablet TAKE 1 TABLET BY MOUTH DAILY 12/7/22  Yes Tre Hdz APRN   baclofen (LIORESAL) 10 MG tablet Take 10 mg by mouth 2 (Two) Times a Day As Needed.   Yes ProviderDayami MD   Cholecalciferol 25 MCG (1000 UT) capsule Take 2,000 Units by mouth Daily.   Yes Emergency, Nurse Epic, RN   colchicine 0.6 MG tablet Take 0.6 mg by mouth Daily As Needed.   Yes ProviderDayami MD   Linzess 145 MCG capsule capsule TAKE 1 CAPSULE(145 MCG) BY MOUTH EVERY DAY 30 MINUTES BEFORE FIRST MEAL OF THE DAY ON AN EMPTY STOMACH  Patient taking differently: Take 145 mcg by mouth Daily As Needed. 12/20/22  Yes Tre Hdz APRN   losartan (COZAAR) 100 MG tablet Take 100 mg by mouth Daily.   Yes Emergency, Nurse SHAUN Cruz   melatonin 3-10 MG tablet tablet Take 1 tablet by mouth At Night As Needed.   Yes Emergency, Nurse SHAUN Cruz   metoprolol succinate XL (TOPROL-XL) 25 MG 24 hr tablet TAKE 1 TABLET BY MOUTH EVERY DAY  Patient taking  differently: Take 100 mg by mouth Daily. 9/28/21  Yes Tre Hdz APRN   ondansetron ODT (ZOFRAN-ODT) 4 MG disintegrating tablet Place 1 tablet on the tongue Every 8 (Eight) Hours As Needed for Nausea or Vomiting. 9/16/21  Yes Moraima Patterson MD   pravastatin (PRAVACHOL) 80 MG tablet TAKE 1 TABLET BY MOUTH EVERY NIGHT 9/26/22  Yes Tre Hdz APRN   TiZANidine (ZANAFLEX) 4 MG capsule Take 1 capsule by mouth 3 (Three) Times a Day As Needed for Muscle Spasms. 8/24/22  Yes Tre Hdz APRN   triamcinolone (KENALOG) 0.1 % cream Apply 1 application topically to the appropriate area as directed 2 (Two) Times a Day. 9/19/22  Yes Tre Hdz APRN            Allergies:  Ibuprofen, Penicillins, and Viagra [sildenafil]      Objective     Vital Signs        Physical Exam:     Physical Exam    NAD, A/O x 4, normal circulation, normal respiration      Result Review :     Assessment & Plan     There are no diagnoses linked to this encounter.       Risks including bleeding, perforation, pain, infection, missed polyp(s). Benefits, and alternatives of Colonoscopy discussed with patient.  All questions answered.  Consent verified.         Juancho Leon MD  12/29/22 09:49 EST

## 2023-01-13 ENCOUNTER — OFFICE VISIT (OUTPATIENT)
Dept: FAMILY MEDICINE CLINIC | Facility: CLINIC | Age: 48
End: 2023-01-13
Payer: MEDICARE

## 2023-01-13 VITALS
HEART RATE: 69 BPM | TEMPERATURE: 97.7 F | BODY MASS INDEX: 35.56 KG/M2 | OXYGEN SATURATION: 98 % | WEIGHT: 248.4 LBS | HEIGHT: 70 IN | DIASTOLIC BLOOD PRESSURE: 74 MMHG | SYSTOLIC BLOOD PRESSURE: 128 MMHG

## 2023-01-13 DIAGNOSIS — M47.812 CERVICAL SPONDYLOSIS: ICD-10-CM

## 2023-01-13 DIAGNOSIS — M51.26 DISPLACEMENT OF LUMBAR INTERVERTEBRAL DISC WITHOUT MYELOPATHY: ICD-10-CM

## 2023-01-13 DIAGNOSIS — G89.29 OTHER CHRONIC PAIN: Primary | ICD-10-CM

## 2023-01-13 DIAGNOSIS — M47.816 LUMBAR SPONDYLOSIS: ICD-10-CM

## 2023-01-13 DIAGNOSIS — M50.30 DDD (DEGENERATIVE DISC DISEASE), CERVICAL: ICD-10-CM

## 2023-01-13 DIAGNOSIS — L30.9 ECZEMA, UNSPECIFIED TYPE: ICD-10-CM

## 2023-01-13 PROCEDURE — 99213 OFFICE O/P EST LOW 20 MIN: CPT | Performed by: NURSE PRACTITIONER

## 2023-01-13 RX ORDER — TRIAMCINOLONE ACETONIDE 1 MG/G
1 CREAM TOPICAL 2 TIMES DAILY
Qty: 45 G | Refills: 1 | Status: SHIPPED | OUTPATIENT
Start: 2023-01-13

## 2023-01-13 NOTE — PROGRESS NOTES
"Chief Complaint  Follow-up (Go over recent procedures)    Subjective         Sameer Méndez presents to [facility]  Patient presents to the office today for a follow-up and to discuss his medicines.  Patient states that he does need a refill on his triamcinolone.  Patient also states that he attends pain management to receive injections for his back pain.  Patient states that he is having a difficult time dealing with this and feels as if is not as effective as it once was.  He states that he would like to discontinue this.  Patient is interested in looking at alternatives to help reduce the amount of medicine that he takes.  Patient states he is interested in medical marijuana.  I explained to the patient that we were trying to determine as an organization policies and procedures and that soon as this information was obtained I would reach back out to him.  She verbalized understanding.  I did review his colonoscopy results which were unremarkable.  Patient is to follow-up in 10 years and regarding his next colonoscopy.  Blood pressure is 120/74.  He denies any chest pain shortness breath palpitations this time.       Objective     Vitals:    01/13/23 0753   BP: 128/74   BP Location: Right arm   Patient Position: Sitting   Cuff Size: Adult   Pulse: 69   Temp: 97.7 °F (36.5 °C)   TempSrc: Temporal   SpO2: 98%   Weight: 113 kg (248 lb 6.4 oz)   Height: 177.8 cm (70\")      Body mass index is 35.64 kg/m².    Class 2 Severe Obesity (BMI >=35 and <=39.9). Obesity-related health conditions include the following: hypertension. Obesity is improving with treatment. BMI is is above average; no BMI management plan is appropriate. We discussed portion control and increasing exercise.        Physical Exam  Constitutional:       Appearance: Normal appearance.   Cardiovascular:      Rate and Rhythm: Normal rate and regular rhythm.      Pulses: Normal pulses.      Heart sounds: Normal heart " sounds, S1 normal and S2 normal. No murmur heard.  Pulmonary:      Effort: Pulmonary effort is normal. No respiratory distress.      Breath sounds: Normal breath sounds.   Skin:     General: Skin is warm and dry.   Neurological:      Mental Status: He is alert and oriented to person, place, and time.   Psychiatric:         Attention and Perception: Attention normal.         Mood and Affect: Mood normal.         Behavior: Behavior normal.          Result Review :   The following data was reviewed by: STEFFANIE Schmitt on 01/13/2023:      Procedures    Assessment and Plan   Diagnoses and all orders for this visit:    1. Other chronic pain (Primary)    2. Eczema, unspecified type  -     triamcinolone (KENALOG) 0.1 % cream; Apply 1 application topically to the appropriate area as directed 2 (Two) Times a Day.  Dispense: 45 g; Refill: 1    3. Cervical spondylosis    4. DDD (degenerative disc disease), cervical    5. Lumbar spondylosis    6. Displacement of lumbar intervertebral disc without myelopathy      Patient denies needing any other refills at this time.  Did explain to the patient that once a process was in place for the letters in regards to medical marijuana that we would reach out to the patient to notify him    Follow Up   Return if symptoms worsen or fail to improve, for Next scheduled follow up.  Patient was given instructions and counseling regarding his condition or for health maintenance advice. Please see specific information pulled into the AVS if appropriate.

## 2023-02-17 ENCOUNTER — TELEPHONE (OUTPATIENT)
Dept: FAMILY MEDICINE CLINIC | Facility: CLINIC | Age: 48
End: 2023-02-17
Payer: MEDICARE

## 2023-03-07 DIAGNOSIS — E78.5 HYPERLIPIDEMIA, UNSPECIFIED HYPERLIPIDEMIA TYPE: ICD-10-CM

## 2023-03-07 RX ORDER — PRAVASTATIN SODIUM 80 MG/1
80 TABLET ORAL NIGHTLY
Qty: 90 TABLET | Refills: 1 | Status: SHIPPED | OUTPATIENT
Start: 2023-03-07

## 2023-10-19 ENCOUNTER — HOSPITAL ENCOUNTER (EMERGENCY)
Facility: HOSPITAL | Age: 48
Discharge: HOME | End: 2023-10-19
Attending: EMERGENCY MEDICINE
Payer: MEDICARE

## 2023-10-19 VITALS
BODY MASS INDEX: 34.36 KG/M2 | RESPIRATION RATE: 20 BRPM | HEART RATE: 69 BPM | HEIGHT: 70 IN | SYSTOLIC BLOOD PRESSURE: 171 MMHG | OXYGEN SATURATION: 100 % | TEMPERATURE: 98.3 F | WEIGHT: 240 LBS | DIASTOLIC BLOOD PRESSURE: 97 MMHG

## 2023-10-19 DIAGNOSIS — M10.9 GOUT OF RIGHT KNEE, UNSPECIFIED CAUSE, UNSPECIFIED CHRONICITY: Primary | ICD-10-CM

## 2023-10-19 PROCEDURE — 25010000002 DEXAMETHASONE SODIUM PHOSPHATE 10 MG/ML SOLUTION: Performed by: NURSE PRACTITIONER

## 2023-10-19 PROCEDURE — 96372 THER/PROPH/DIAG INJ SC/IM: CPT

## 2023-10-19 PROCEDURE — 99283 EMERGENCY DEPT VISIT LOW MDM: CPT

## 2023-10-19 RX ORDER — PREDNISONE 20 MG/1
60 TABLET ORAL DAILY
Qty: 15 TABLET | Refills: 0 | Status: SHIPPED | OUTPATIENT
Start: 2023-10-19 | End: 2023-10-24

## 2023-10-19 RX ORDER — HYDROCODONE BITARTRATE AND ACETAMINOPHEN 5; 325 MG/1; MG/1
1 TABLET ORAL EVERY 6 HOURS PRN
Qty: 12 TABLET | Refills: 0 | Status: SHIPPED | OUTPATIENT
Start: 2023-10-19

## 2023-10-19 RX ORDER — DEXAMETHASONE SODIUM PHOSPHATE 10 MG/ML
10 INJECTION, SOLUTION INTRAMUSCULAR; INTRAVENOUS ONCE
Status: COMPLETED | OUTPATIENT
Start: 2023-10-19 | End: 2023-10-19

## 2023-10-19 RX ORDER — COLCHICINE 0.6 MG/1
1.2 TABLET ORAL ONCE
Status: DISCONTINUED | OUTPATIENT
Start: 2023-10-19 | End: 2023-10-19

## 2023-10-19 RX ORDER — HYDROCODONE BITARTRATE AND ACETAMINOPHEN 5; 325 MG/1; MG/1
1 TABLET ORAL ONCE
Status: COMPLETED | OUTPATIENT
Start: 2023-10-19 | End: 2023-10-19

## 2023-10-19 RX ADMIN — HYDROCODONE BITARTRATE AND ACETAMINOPHEN 1 TABLET: 5; 325 TABLET ORAL at 06:04

## 2023-10-19 RX ADMIN — DEXAMETHASONE SODIUM PHOSPHATE 10 MG: 10 INJECTION INTRAMUSCULAR; INTRAVENOUS at 06:04

## 2023-10-19 NOTE — ED PROVIDER NOTES
Time: 5:12 AM EDT  Date of encounter:  10/19/2023  Independent Historian/Clinical History and Information was obtained by:   Patient    History is limited by: N/A    Chief Complaint: Knee pain      History of Present Illness:  Patient is a 48 y.o. year old male who presents to the emergency department for evaluation of right knee pain.  Patient has a history of gout intermittently.  This episode feels similar to his past episodes.  Patient states started having a flareup yesterday.  No relief with home meds.  No injury.  No fever.  Mild swelling.  No wound or rash.  No numbness tingling or weakness    HPI    Patient Care Team  Primary Care Provider: Tre Hdz APRN    Past Medical History:     Allergies   Allergen Reactions    Ibuprofen GI Intolerance    Penicillins Rash    Viagra [Sildenafil] Other (See Comments)     BODYACHES     Past Medical History:   Diagnosis Date    DDD (degenerative disc disease), thoracolumbar     Gout     Hyperlipidemia     Hypertension     Sleep apnea      Past Surgical History:   Procedure Laterality Date    COLONOSCOPY      COLONOSCOPY N/A 12/29/2022    Procedure: COLONOSCOPY;  Surgeon: Juancho Leon MD;  Location: [facility] ENDOSCOPY;  Service: General;  Laterality: N/A;  NORMAL COLON     Family History   Problem Relation Age of Onset    Diabetes Mother     Cancer Father     Malig Hyperthermia Neg Hx        Home Medications:  Prior to Admission medications    Medication Sig Start Date End Date Taking? Authorizing Provider   allopurinol (ZYLOPRIM) 100 MG tablet Take 100 mg by mouth Daily.    Emergency, Nurse SHAUN Cruz   amLODIPine (NORVASC) 10 MG tablet TAKE 1 TABLET BY MOUTH DAILY 12/7/22   Tre Hdz APRN   baclofen (LIORESAL) 10 MG tablet Take 10 mg by mouth 2 (Two) Times a Day As Needed.    Provider, MD Dayami   Cholecalciferol 25 MCG (1000 UT) capsule Take 2,000 Units by mouth Daily.    Emergency, Nurse Epic, RN   colchicine 0.6 MG tablet Take 0.6 mg by mouth Daily  As Needed.    Provider, MD Dayami   linaclotide (Linzess) 145 MCG capsule capsule Take 1 capsule by mouth Daily As Needed (constipation). 3/28/23   Tre Hdz APRN   losartan (COZAAR) 100 MG tablet Take 100 mg by mouth Daily.    Emergency, Nurse SHAUN Cruz   melatonin 3-10 MG tablet tablet Take 1 tablet by mouth At Night As Needed.    Emergency, Nurse SHAUN Cruz   metoprolol succinate XL (TOPROL-XL) 25 MG 24 hr tablet TAKE 1 TABLET BY MOUTH EVERY DAY  Patient taking differently: Take 100 mg by mouth Daily. 9/28/21   Tre Hdz APRN   ondansetron ODT (ZOFRAN-ODT) 4 MG disintegrating tablet Place 1 tablet on the tongue Every 8 (Eight) Hours As Needed for Nausea or Vomiting. 9/16/21   Moraima Patterson MD   pravastatin (PRAVACHOL) 80 MG tablet TAKE 1 TABLET BY MOUTH EVERY NIGHT 3/7/23   Tre Hdz APRN   TiZANidine (ZANAFLEX) 4 MG capsule Take 1 capsule by mouth 3 (Three) Times a Day As Needed for Muscle Spasms. 8/24/22   Tre Hdz APRN   triamcinolone (KENALOG) 0.1 % cream Apply 1 application topically to the appropriate area as directed 2 (Two) Times a Day. 1/13/23   Tre Hdz APRN        Social History:   Social History     Tobacco Use    Smoking status: Never    Smokeless tobacco: Never   Vaping Use    Vaping Use: Never used   Substance Use Topics    Alcohol use: Not Currently    Drug use: Never         Review of Systems:  Review of Systems   Constitutional:  Negative for chills and fever.   Respiratory:  Negative for shortness of breath.    Cardiovascular:  Negative for leg swelling.   Musculoskeletal:  Positive for arthralgias (Right knee), gait problem (Painful due to gout flareup) and joint swelling. Negative for back pain and neck pain.   Skin:  Negative for color change, rash and wound.   Neurological:  Negative for weakness and numbness.   Hematological: Negative.    Psychiatric/Behavioral: Negative.     All other systems reviewed and are negative.       Physical Exam:  /97   Pulse  "69   Temp 98.3 °F (36.8 °C)   Resp 20   Ht 177.8 cm (70\")   Wt 109 kg (240 lb)   SpO2 100%   BMI 34.44 kg/m²     Physical Exam  Vitals and nursing note reviewed.   Constitutional:       General: He is not in acute distress.     Appearance: Normal appearance. He is not toxic-appearing.   HENT:      Head: Atraumatic.      Mouth/Throat:      Mouth: Mucous membranes are moist.   Eyes:      General: No scleral icterus.     Conjunctiva/sclera: Conjunctivae normal.   Cardiovascular:      Pulses: Normal pulses.   Pulmonary:      Effort: Pulmonary effort is normal. No respiratory distress.   Abdominal:      Tenderness: There is no abdominal tenderness.   Musculoskeletal:         General: Swelling (Mild right knee) and tenderness (Diffuse moderate right knee) present.      Cervical back: Normal range of motion.      Comments: Range of motion of right knee limited due to pain   Skin:     General: Skin is warm and dry.      Capillary Refill: Capillary refill takes less than 2 seconds.      Findings: No erythema.   Neurological:      Mental Status: He is alert and oriented to person, place, and time.      Sensory: No sensory deficit.      Motor: No weakness.      Gait: Gait abnormal (Limping).   Psychiatric:         Mood and Affect: Mood normal.         Behavior: Behavior normal.              Medical Decision Making:      Comorbidities that affect care:    Gout, Hypertension    External Notes reviewed:    None      The following orders were placed and all results were independently analyzed by me:  No orders of the defined types were placed in this encounter.      Medications Given in the Emergency Department:  Medications   dexAMETHasone sodium phosphate injection 10 mg (10 mg Intramuscular Given 10/19/23 0604)   HYDROcodone-acetaminophen (NORCO) 5-325 MG per tablet 1 tablet (1 tablet Oral Given 10/19/23 0604)        ED Course:         Labs:    Lab Results (last 24 hours)       ** No results found for the last 24 hours. " **             Imaging:    No Radiology Exams Resulted Within Past 24 Hours      Differential Diagnosis and Discussion:    Extremity Pain: Differential diagnosis includes but is not limited to soft tissue sprain, tendonitis, tendon injury, dislocation, fracture, deep vein thrombosis, arterial insufficiency, osteoarthritis, bursitis, and ligamentous damage.        MDM  Number of Diagnoses or Management Options  Gout of right knee, unspecified cause, unspecified chronicity  Diagnosis management comments: I have explained the patient´s condition, diagnoses and treatment plan based on the information available to me at this time. I have answered questions and addressed any concerns. The patient has a good  understanding of the patient´s diagnosis, condition, and treatment plan as can be expected at this point. The vital signs have been stable. The patient´s condition is stable and appropriate for discharge from the emergency department.      The patient will pursue further outpatient evaluation with the primary care physician or other designated or consulting physician as outlined in the discharge instructions. They are agreeable to this plan of care and follow-up instructions have been explained in detail. The patient has received these instructions in written format and have expressed an understanding of the discharge instructions. The patient is aware that any significant change in condition or worsening of symptoms should prompt an immediate return to this or the closest emergency department or call to 911.       Amount and/or Complexity of Data Reviewed  Tests in the medicine section of CPT®: ordered and reviewed    Risk of Complications, Morbidity, and/or Mortality  Presenting problems: low  Management options: low    Patient Progress  Patient progress: stable             Patient Care Considerations:    LABS: I considered ordering labs, however patient has had recurrent histories of gout and feels these are the  same.  Patient has no findings of a septic joint so labs are not needed.  Also considered ordering imaging but patient has no history of trauma      Consultants/Shared Management Plan:    None    Social Determinants of Health:    Patient is independent, reliable, and has access to care.       Disposition and Care Coordination:    Discharged: The patient is suitable and stable for discharge with no need for consideration of observation or admission.    I have explained the patient´s condition, diagnoses and treatment plan based on the information available to me at this time. I have answered questions and addressed any concerns. The patient has a good  understanding of the patient´s diagnosis, condition, and treatment plan as can be expected at this point. The vital signs have been stable. The patient´s condition is stable and appropriate for discharge from the emergency department.      The patient will pursue further outpatient evaluation with the primary care physician or other designated or consulting physician as outlined in the discharge instructions. They are agreeable to this plan of care and follow-up instructions have been explained in detail. The patient has received these instructions in written format and have expressed an understanding of the discharge instructions. The patient is aware that any significant change in condition or worsening of symptoms should prompt an immediate return to this or the closest emergency department or call to 911.  I have explained discharge medications and the need for follow up with the patient/caretakers. This was also printed in the discharge instructions. Patient was discharged with the following medications and follow up:      Medication List        New Prescriptions      HYDROcodone-acetaminophen 5-325 MG per tablet  Commonly known as: NORCO  Take 1 tablet by mouth Every 6 (Six) Hours As Needed for Severe Pain.     predniSONE 20 MG tablet  Commonly known as:  DELTASONE  Take 3 tablets by mouth Daily for 5 days.            Changed      metoprolol succinate XL 25 MG 24 hr tablet  Commonly known as: TOPROL-XL  TAKE 1 TABLET BY MOUTH EVERY DAY  What changed: how much to take               Where to Get Your Medications        These medications were sent to [id number] - [place], [place] - [address] AT [facility] - [phone]  - [phone] FX  [address] [place] [address]      Phone: [phone]   HYDROcodone-acetaminophen 5-325 MG per tablet  predniSONE 20 MG tablet      Tre Hdz, STEFFANIE  [address]  [address]  [facility] [address]  [phone]    Schedule an appointment as soon as possible for a visit          Final diagnoses:   Gout of right knee, unspecified cause, unspecified chronicity        ED Disposition       ED Disposition   Discharge    Condition   Stable    Comment   --               This medical record created using voice recognition software.             Tiffany Drake, STEFFANIE  10/19/23 0657

## 2023-10-19 NOTE — Clinical Note
[facility] EMERGENCY ROOM  [address]  [place] [address]  Phone: [phone]    Sameer Méndez was seen and treated in our emergency department on 10/19/2023.  He may return to work on 10/22/2023.         Thank you for choosing [facility].    Tiffany Drake APRN

## 2023-10-19 NOTE — DISCHARGE INSTRUCTIONS
Take your home colchicine    Take medications as prescribed for symptomatic treatment    Rest.  Ice.    Follow-up with your PCP    Return for new or worsening symptoms

## 2024-08-16 NOTE — PROGRESS NOTES
Progress Note      Patient Name: Sameer Méndez   Patient ID: [id number]   Sex: Male   YOB: 1975    Primary Care Provider: Tre VALIENTE    Visit Date: May 4, 2020    Provider: STEFFANIE Schmitt   Location: [facility]   Location Address: [address] [place] [address], [address]  [id number]   Location Phone: [phone]          Chief Complaint  · back pain  · tightness in low back radiates around to right side  · constipation      History Of Present Illness  Sameer Méndez is a 45 year old /Black male who presents for evaluation and treatment of:      Presents to the office today to discuss his low back pain.  He states that the pain radiates around to his right side and occasionally down his right leg.  Patient does state that diclofenac is helping somewhat as well as his cyclobenzaprine.  Patient was referred to PT in March but due to them closing he was unable to go.  I did discuss see if we can get him back into physical therapy due to the office is opening back up.  Patient also states that he is having constipation secondary to taking the vitamin D weekly.  States that he has been tried over-the-counter medications without any success.  I explained that I would give him samples of Linzess to try.    Does state that the testosterone and vitamin D is helping significantly with his energy levels.  He states that testosterone helps with his erectile dysfunction and his low libido       Past Medical History  Degeneration of lumbar intervertebral disc; Foot pain; Gout; Hypertension; Knee Pain; Lumbago/low back pain; Lumbosacral disc herniation, L5-S1; Shoulder pain         Past Surgical History  *No Past Surgical History         Medication List  amlodipine 10 mg oral tablet; colchicine 0.6 mg oral tablet; cyclobenzaprine 10 mg oral tablet; diclofenac sodium 50 mg oral tablet,delayed release (DR/EC); duloxetine 20 mg oral capsule,delayed release(DR/EC);  ADELA Saucedo, [facility], [phone], is calling regarding an established patient with [facility].     Requesting orders from: Aisha Hull    Provider is following patient: Yes  Is this a 60-day recertification request?  No    Orders Requested    Skilled Nursing  Request for initial certification (first set of orders) Frequency:  2x/wk for 3 wks  Then, 1 x per week for 5 weeks    Occupational Therapy  Request for initial evaluation and treatment (one time)     Social Work  Request for initial evaluation and treatment (one time)     HHA (Home Health Aide)  Request for initial certification (first set of orders)Frequency:  1x/wk for 4 wks    Also, asks for referral to cardiology for follow up?  Pt recently discharged to home from hospital with new diagnosis of a-fib after pt sustained a fall.  Pt was placed on metoprolol and Eliquis.    Pt held Eliquis the past 3 days because she took on empty stomach and reported that she did not feel well on these medications.    Home care nurse asked pt to take with food and pt resumed Eliquis and metoprolol today.    However, pt reports that she fell again this morning on her way to the bathroom.  No apparent injury.      Information was gathered for PCP.    Provider review needed.    RN will contact Home Care with information after provider review.  Confirmed ok to leave a detailed message with call back.  Contact information confirmed and updated as needed.    Barbara Graves RN     "duloxetine 40 mg oral capsule,delayed release(DR/EC); febuxostat 80 mg oral tablet; gabapentin 600 mg oral tablet; losartan 100 mg oral tablet; melatonin 3 mg oral capsule; metoprolol succinate 25 mg oral tablet extended release 24 hr; rizatriptan 10 mg oral tablet; rosuvastatin 20 mg oral tablet; spironolactone 25 mg oral tablet; tadalafil 5 mg oral tablet; testosterone cypionate 200 mg/mL intramuscular oil; Toprol XL 50 mg oral tablet extended release 24 hr; Vitamin D2 50,000 unit oral capsule         Allergy List  ibuprofen; PENICILLINS; Viagra         Family Medical History  *Non Contributory         Social History  Tobacco (Never)         Immunizations  Name Date Admin   Influenza Refused         Review of Systems  · Constitutional  o Denies  o : fever, fatigue, weight loss, weight gain  · Cardiovascular  o Denies  o : lower extremity edema, claudication, chest pressure, palpitations  · Respiratory  o Denies  o : shortness of breath, wheezing, cough, hemoptysis, dyspnea on exertion  · Gastrointestinal  o Admits  o : constipation  o Denies  o : nausea, vomiting, diarrhea, abdominal pain  · Musculoskeletal  o Admits  o : back pain      Vitals  Date Time BP Position Site L\R Cuff Size HR RR TEMP (F) WT  HT  BMI kg/m2 BSA m2 O2 Sat        05/04/2020 08:04 /96 Sitting    69 - R 16 98.9 253lbs 16oz 5'  10\" 36.44 2.39 98 %          Physical Examination  · Constitutional  o Appearance  o : well-nourished, in no acute distress  · Neck  o Inspection/Palpation  o : normal appearance, no masses or tenderness, trachea midline  o Range of Motion  o : cervical range of motion within normal limits  o Thyroid  o : gland size normal, nontender, no nodules or masses present on palpation  · Respiratory  o Respiratory Effort  o : breathing unlabored  o Inspection of Chest  o : normal appearance  o Auscultation of Lungs  o : normal breath sounds throughout inspiration and expiration  · Cardiovascular  o Heart  o : "   § Auscultation of Heart  § : regular rate and rhythm, no murmurs, gallops or rubs  o Peripheral Vascular System  o :   § Extremities  § : no clubbing or edema  · Skin and Subcutaneous Tissue  o General Inspection  o : no rashes or lesions present, no areas of discoloration  o Body Hair  o : hair normal for age, general body hair distribution normal for age  o Digits and Nails  o : no clubbing, cyanosis, deformities or edema present, normal appearing nails  · Neurologic  o Mental Status Examination  o :   § Orientation  § : grossly oriented to person, place and time  o Gait and Station  o : normal gait, able to stand without difficulty  · Psychiatric  o Judgement and Insight  o : judgment and insight intact  o Mood and Affect  o : mood normal, affect appropriate  o Presence of Abnormal Thoughts  o : no hallucinations, no delusions present, no psychotic thoughts          Assessment  · Hypogonadism, male     257.2/E29.1  · Lumbago     724.2/M54.5  · Vitamin D deficiency     268.9/E55.9  · Constipation     564.00/K59.00    Problems Reconciled  Plan  · Orders  o CBC with Auto Diff [facility] (38485) - - 05/04/2020  o CMP [facility] (31680) - - 05/04/2020  o Vitamin D (25-Hydroxy) Level (94688) - - 05/04/2020  o ACO-39: Current medications updated and reviewed () - - 05/04/2020  o ACO-14: Influenza immunization was not administered for reasons documented () - - 05/04/2020  o Testosterone (Total) (11361) - - 05/04/2020  · Medications  o Medications have been Reconciled  o Transition of Care or Provider Policy  · Instructions  o Patient was educated/instructed on their diagnosis, treatment and medications prior to discharge from the clinic today.  o Time spent with the patient was minutes, more than 50% face to face.  o Electronically Identified Patient Education Materials Provided Electronically  · Disposition  o Call or Return if symptoms worsen or persist.  o follow up in 6 months            Electronically Signed by: Tre  STEFFANIE Hdz -Author on May 4, 2020 09:57:58 AM

## 2024-09-17 ENCOUNTER — DOCUMENTATION (OUTPATIENT)
Dept: PHYSICAL THERAPY | Facility: CLINIC | Age: 49
End: 2024-09-17
Payer: OTHER GOVERNMENT

## 2024-09-20 ENCOUNTER — OFFICE VISIT (OUTPATIENT)
Dept: FAMILY MEDICINE CLINIC | Facility: CLINIC | Age: 49
End: 2024-09-20
Payer: MEDICARE

## 2024-09-20 VITALS
SYSTOLIC BLOOD PRESSURE: 154 MMHG | WEIGHT: 247.6 LBS | OXYGEN SATURATION: 97 % | TEMPERATURE: 96.8 F | BODY MASS INDEX: 35.45 KG/M2 | HEART RATE: 88 BPM | HEIGHT: 70 IN | RESPIRATION RATE: 16 BRPM | DIASTOLIC BLOOD PRESSURE: 110 MMHG

## 2024-09-20 DIAGNOSIS — M1A.4720 OTHER SECONDARY CHRONIC GOUT OF LEFT ANKLE WITHOUT TOPHUS: Primary | ICD-10-CM

## 2024-09-20 DIAGNOSIS — M72.2 PLANTAR FASCIITIS OF LEFT FOOT: ICD-10-CM

## 2024-09-20 PROCEDURE — 3077F SYST BP >= 140 MM HG: CPT | Performed by: NURSE PRACTITIONER

## 2024-09-20 PROCEDURE — 99213 OFFICE O/P EST LOW 20 MIN: CPT | Performed by: NURSE PRACTITIONER

## 2024-09-20 PROCEDURE — 96372 THER/PROPH/DIAG INJ SC/IM: CPT | Performed by: NURSE PRACTITIONER

## 2024-09-20 PROCEDURE — 1125F AMNT PAIN NOTED PAIN PRSNT: CPT | Performed by: NURSE PRACTITIONER

## 2024-09-20 PROCEDURE — 3080F DIAST BP >= 90 MM HG: CPT | Performed by: NURSE PRACTITIONER

## 2024-09-20 PROCEDURE — 1159F MED LIST DOCD IN RCRD: CPT | Performed by: NURSE PRACTITIONER

## 2024-09-20 PROCEDURE — 1160F RVW MEDS BY RX/DR IN RCRD: CPT | Performed by: NURSE PRACTITIONER

## 2024-09-20 RX ORDER — FEBUXOSTAT 40 MG/1
40 TABLET, FILM COATED ORAL DAILY
COMMUNITY

## 2024-09-20 RX ORDER — VALSARTAN 160 MG/1
160 TABLET ORAL DAILY
COMMUNITY

## 2024-09-20 RX ORDER — KETOROLAC TROMETHAMINE 30 MG/ML
30 INJECTION, SOLUTION INTRAMUSCULAR; INTRAVENOUS ONCE
Status: COMPLETED | OUTPATIENT
Start: 2024-09-20 | End: 2024-09-20

## 2024-09-20 RX ORDER — KETOROLAC TROMETHAMINE 30 MG/ML
30 INJECTION, SOLUTION INTRAMUSCULAR; INTRAVENOUS EVERY 6 HOURS PRN
Status: DISCONTINUED | OUTPATIENT
Start: 2024-09-20 | End: 2024-09-20

## 2024-09-20 RX ORDER — METOPROLOL TARTRATE 100 MG
50 TABLET ORAL DAILY
COMMUNITY

## 2024-09-20 RX ORDER — DICLOFENAC SODIUM 75 MG/1
75 TABLET, DELAYED RELEASE ORAL 2 TIMES DAILY
Qty: 60 TABLET | Refills: 1 | Status: SHIPPED | OUTPATIENT
Start: 2024-09-20

## 2024-09-20 RX ADMIN — KETOROLAC TROMETHAMINE 30 MG: 30 INJECTION, SOLUTION INTRAMUSCULAR; INTRAVENOUS at 15:38

## 2024-09-25 DIAGNOSIS — M10.9 GOUT, UNSPECIFIED CAUSE, UNSPECIFIED CHRONICITY, UNSPECIFIED SITE: ICD-10-CM

## 2024-09-25 RX ORDER — COLCHICINE 0.6 MG/1
0.6 TABLET ORAL DAILY
Qty: 90 TABLET | Refills: 1 | Status: SHIPPED | OUTPATIENT
Start: 2024-09-25

## 2024-11-21 DIAGNOSIS — M1A.4720 OTHER SECONDARY CHRONIC GOUT OF LEFT ANKLE WITHOUT TOPHUS: ICD-10-CM

## 2024-11-21 DIAGNOSIS — M72.2 PLANTAR FASCIITIS OF LEFT FOOT: ICD-10-CM

## 2024-11-21 NOTE — TELEPHONE ENCOUNTER
UPCOMING APPTS  With Family Medicine (STEFFANIE Schmitt)  12/10/2024 at 8:30 AM  LAST OFFICE VISIT - THIS DEPT  9/20/2024 Ronn Galvez APRN

## 2024-11-22 RX ORDER — DICLOFENAC SODIUM 75 MG/1
75 TABLET, DELAYED RELEASE ORAL 2 TIMES DAILY
Qty: 60 TABLET | Refills: 1 | Status: SHIPPED | OUTPATIENT
Start: 2024-11-22

## 2024-11-27 ENCOUNTER — OFFICE VISIT (OUTPATIENT)
Dept: FAMILY MEDICINE CLINIC | Facility: CLINIC | Age: 49
End: 2024-11-27
Payer: MEDICARE

## 2024-11-27 VITALS
HEART RATE: 83 BPM | WEIGHT: 249 LBS | SYSTOLIC BLOOD PRESSURE: 134 MMHG | HEIGHT: 70 IN | BODY MASS INDEX: 35.65 KG/M2 | OXYGEN SATURATION: 98 % | TEMPERATURE: 98.9 F | DIASTOLIC BLOOD PRESSURE: 84 MMHG

## 2024-11-27 DIAGNOSIS — I10 HYPERTENSION, UNSPECIFIED TYPE: ICD-10-CM

## 2024-11-27 DIAGNOSIS — Z00.00 MEDICARE ANNUAL WELLNESS VISIT, SUBSEQUENT: ICD-10-CM

## 2024-11-27 DIAGNOSIS — R73.01 IMPAIRED FASTING GLUCOSE: Primary | ICD-10-CM

## 2024-11-27 DIAGNOSIS — R73.01 IMPAIRED FASTING GLUCOSE: ICD-10-CM

## 2024-11-27 PROCEDURE — 1125F AMNT PAIN NOTED PAIN PRSNT: CPT | Performed by: NURSE PRACTITIONER

## 2024-11-27 PROCEDURE — 96160 PT-FOCUSED HLTH RISK ASSMT: CPT | Performed by: NURSE PRACTITIONER

## 2024-11-27 PROCEDURE — G0439 PPPS, SUBSEQ VISIT: HCPCS | Performed by: NURSE PRACTITIONER

## 2024-11-27 PROCEDURE — 3075F SYST BP GE 130 - 139MM HG: CPT | Performed by: NURSE PRACTITIONER

## 2024-11-27 PROCEDURE — 1170F FXNL STATUS ASSESSED: CPT | Performed by: NURSE PRACTITIONER

## 2024-11-27 PROCEDURE — 3079F DIAST BP 80-89 MM HG: CPT | Performed by: NURSE PRACTITIONER

## 2024-11-27 PROCEDURE — 1159F MED LIST DOCD IN RCRD: CPT | Performed by: NURSE PRACTITIONER

## 2024-11-27 PROCEDURE — 1160F RVW MEDS BY RX/DR IN RCRD: CPT | Performed by: NURSE PRACTITIONER

## 2024-11-27 PROCEDURE — 99214 OFFICE O/P EST MOD 30 MIN: CPT | Performed by: NURSE PRACTITIONER

## 2024-11-27 RX ORDER — SEMAGLUTIDE 1.34 MG/ML
0.5 INJECTION, SOLUTION SUBCUTANEOUS WEEKLY
Qty: 4.5 ML | Refills: 1 | Status: SHIPPED | OUTPATIENT
Start: 2024-11-27

## 2024-11-27 RX ORDER — CLONIDINE HYDROCHLORIDE 0.1 MG/1
0.1 TABLET ORAL 2 TIMES DAILY
Qty: 180 TABLET | Refills: 1 | Status: SHIPPED | OUTPATIENT
Start: 2024-11-27

## 2024-11-27 RX ORDER — SEMAGLUTIDE 1.34 MG/ML
0.5 INJECTION, SOLUTION SUBCUTANEOUS WEEKLY
Qty: 4.5 ML | Refills: 1 | Status: SHIPPED | OUTPATIENT
Start: 2024-11-27 | End: 2024-11-27 | Stop reason: SDUPTHER

## 2024-11-27 NOTE — PROGRESS NOTES
Subjective   The ABCs of the Annual Wellness Visit  Medicare Wellness Visit      Sameer Méndez is a 49 y.o. patient who presents for a Medicare Wellness Visit.    The following portions of the patient's history were reviewed and   updated as appropriate: allergies, current medications, past family history, past medical history, past social history, past surgical history, and problem list.    Compared to one year ago, the patient's physical   health is the same.  Compared to one year ago, the patient's mental   health is the same.    Recent Hospitalizations:  He was not admitted to the hospital during the last year.     Current Medical Providers:  Patient Care Team:  Tre Hdz APRN as PCP - General (Nurse Practitioner)  Maine Greenberg APRN as Nurse Practitioner (Nurse Practitioner)    Outpatient Medications Prior to Visit   Medication Sig Dispense Refill    amLODIPine (NORVASC) 10 MG tablet TAKE 1 TABLET BY MOUTH DAILY 90 tablet 1    Cholecalciferol 25 MCG (1000 UT) capsule Take 2 capsules by mouth Daily.      colchicine 0.6 MG tablet Take 1 tablet by mouth Daily. 90 tablet 1    diclofenac (VOLTAREN) 75 MG EC tablet TAKE 1 TABLET BY MOUTH TWICE DAILY 60 tablet 1    febuxostat (ULORIC) 40 MG tablet Take 1 tablet by mouth Daily.      linaclotide (Linzess) 145 MCG capsule capsule Take 1 capsule by mouth Daily As Needed (constipation). 90 capsule 0    melatonin 3-10 MG tablet tablet Take 1 tablet by mouth At Night As Needed.      metoprolol succinate XL (TOPROL-XL) 25 MG 24 hr tablet TAKE 1 TABLET BY MOUTH EVERY DAY (Patient taking differently: Take 4 tablets by mouth Daily.) 90 tablet 1    metoprolol tartrate (LOPRESSOR) 100 MG tablet Take 0.5 tablets by mouth Daily.      ondansetron ODT (ZOFRAN-ODT) 4 MG disintegrating tablet Place 1 tablet on the tongue Every 8 (Eight) Hours As Needed for Nausea or Vomiting. 15 tablet 0    pravastatin (PRAVACHOL) 80 MG tablet TAKE 1 TABLET BY MOUTH EVERY NIGHT 90 tablet 1     TiZANidine (ZANAFLEX) 4 MG capsule Take 1 capsule by mouth 3 (Three) Times a Day As Needed for Muscle Spasms. 90 capsule 1    triamcinolone (KENALOG) 0.1 % cream Apply 1 application topically to the appropriate area as directed 2 (Two) Times a Day. 45 g 1    valsartan (DIOVAN) 160 MG tablet Take 1 tablet by mouth Daily.      cloNIDine (CATAPRES) 0.1 MG tablet Take 1 tablet by mouth 2 (Two) Times a Day for 30 days. 60 tablet 0    baclofen (LIORESAL) 10 MG tablet Take 10 mg by mouth 2 (Two) Times a Day As Needed. (Patient not taking: Reported on 9/20/2024)      HYDROcodone-acetaminophen (NORCO) 5-325 MG per tablet Take 1 tablet by mouth Every 6 (Six) Hours As Needed for Severe Pain. (Patient not taking: Reported on 9/20/2024) 12 tablet 0     No facility-administered medications prior to visit.     No opioid medication identified on active medication list. I have reviewed chart for other potential  high risk medication/s and harmful drug interactions in the elderly.      Aspirin is not on active medication list.  Aspirin use is not indicated based on review of current medical condition/s. Risk of harm outweighs potential benefits.  .    Patient Active Problem List   Diagnosis    Cervical radiculopathy    Cervical spondylosis    DDD (degenerative disc disease), cervical    Degeneration of lumbar intervertebral disc    Lumbar spondylosis    Displacement of lumbar intervertebral disc without myelopathy    Gout    Hypertension    Shoulder pain    Neck pain    Low back pain    Foot pain    Screening for malignant neoplasm of colon     Advance Care Planning Advance Directive is not on file.  ACP discussion was held with the patient during this visit. Patient does not have an advance directive, declines further assistance.            Objective   Vitals:    11/27/24 0925   BP: 134/84   BP Location: Right arm   Patient Position: Sitting   Cuff Size: Adult   Pulse: 83   Temp: 98.9 °F (37.2 °C)   TempSrc: Temporal   SpO2: 98%  "  Weight: 113 kg (249 lb)   Height: 177.8 cm (70\")   PainSc:   3       Estimated body mass index is 35.73 kg/m² as calculated from the following:    Height as of this encounter: 177.8 cm (70\").    Weight as of this encounter: 113 kg (249 lb).    Class 2 Severe Obesity (BMI >=35 and <=39.9). Obesity-related health conditions include the following: hypertension, impaired fasting glucose, and dyslipidemias. Obesity is unchanged. BMI is is above average; no BMI management plan is appropriate. We discussed portion control and increasing exercise.       Does the patient have evidence of cognitive impairment? No                                                                                                Health  Risk Assessment    Smoking Status:  Social History     Tobacco Use   Smoking Status Never    Passive exposure: Never   Smokeless Tobacco Never     Alcohol Consumption:  Social History     Substance and Sexual Activity   Alcohol Use Not Currently       Fall Risk Screen  STEADI Fall Risk Assessment was completed, and patient is at MODERATE risk for falls. Assessment completed on:2024    Depression Screening   Little interest or pleasure in doing things? Not at all   Feeling down, depressed, or hopeless? Several days   PHQ-2 Total Score 1      Health Habits and Functional and Cognitive Screenin/27/2024     9:28 AM   Functional & Cognitive Status   Do you have difficulty preparing food and eating? No   Do you have difficulty bathing yourself, getting dressed or grooming yourself? Yes   Do you have difficulty using the toilet? Yes   Do you have difficulty moving around from place to place? Yes   Do you have trouble with steps or getting out of a bed or a chair? Yes   Current Diet Well Balanced Diet   Dental Exam Up to date   Eye Exam Up to date   Exercise (times per week) 0 times per week   Current Exercises Include No Regular Exercise   Do you need help using the phone?  No   Are you deaf or do you " have serious difficulty hearing?  Yes   Do you need help to go to places out of walking distance? No   Do you need help shopping? Yes   Do you need help preparing meals?  Yes   Do you need help with housework?  Yes   Do you need help with laundry? Yes   Do you need help taking your medications? No   Do you need help managing money? No   Do you ever drive or ride in a car without wearing a seat belt? No   Have you felt unusual stress, anger or loneliness in the last month? Yes   Who do you live with? Spouse   If you need help, do you have trouble finding someone available to you? No   Have you been bothered in the last four weeks by sexual problems? No   Do you have difficulty concentrating, remembering or making decisions? No           Age-appropriate Screening Schedule:  Refer to the list below for future screening recommendations based on patient's age, sex and/or medical conditions. Orders for these recommended tests are listed in the plan section. The patient has been provided with a written plan.    Health Maintenance List  Health Maintenance   Topic Date Due    LIPID PANEL  03/24/2023    BMI FOLLOWUP  01/13/2024    COVID-19 Vaccine (1 - 2024-25 season) 02/16/2025 (Originally 9/1/2024)    INFLUENZA VACCINE  03/31/2025 (Originally 7/1/2024)    ANNUAL WELLNESS VISIT  11/27/2025    COLORECTAL CANCER SCREENING  12/29/2032    TDAP/TD VACCINES (3 - Td or Tdap) 04/01/2034    HEPATITIS C SCREENING  Completed    ZOSTER VACCINE  Completed    Pneumococcal Vaccine 0-64  Aged Out    HEMOGLOBIN A1C  Discontinued                                                                                                                                                CMS Preventative Services Quick Reference  Risk Factors Identified During Encounter  None Identified  Chronic Pain:  Continue pain management     The above risks/problems have been discussed with the patient.  Pertinent information has been shared with the patient in the After  "Visit Summary.  An After Visit Summary and PPPS were made available to the patient.    Follow Up:   Next Medicare Wellness visit to be scheduled in 1 year.         Additional E&M Note during same encounter follows:  Patient has additional, significant, and separately identifiable condition(s)/problem(s) that require work above and beyond the Medicare Wellness Visit     Chief Complaint  Hypertension (Urgent care follow up) and Medicare Wellness-subsequent    Subjective   Hypertension      Sameer is also being seen today for additional medical problem/s.  Patient presents to the office for a follow-up regarding his hypertension.  Patient was seen in urgent care for his blood pressure.  He is currently maxed out on his beta-blocker, calcium channel blocker and his ARB.  Patient was given clonidine to take twice a day at urgent care.  Blood pressure today is 134/84.  I did explain that we would continue the clonidine as this seems to be controlling his blood pressure well.  Patient also states that he would like to get back on the Ozempic that he was on previously.  He states that he was tolerating the medication well.  He states that [facility] and will get copies of the results for us to review.  Patient also states he has some forms for the [facility] that need to be completed.                Objective   Vital Signs:  /84 (BP Location: Right arm, Patient Position: Sitting, Cuff Size: Adult)   Pulse 83   Temp 98.9 °F (37.2 °C) (Temporal)   Ht 177.8 cm (70\")   Wt 113 kg (249 lb)   SpO2 98%   BMI 35.73 kg/m²   Physical Exam  Vitals reviewed.   Constitutional:       Appearance: Normal appearance.   Cardiovascular:      Rate and Rhythm: Normal rate and regular rhythm.      Pulses: Normal pulses.      Heart sounds: Normal heart sounds, S1 normal and S2 normal. No murmur heard.  Pulmonary:      Effort: Pulmonary effort is normal. No respiratory distress.      Breath sounds: Normal breath sounds. "   Skin:     General: Skin is warm and dry.   Neurological:      Mental Status: He is alert and oriented to person, place, and time.   Psychiatric:         Attention and Perception: Attention normal.         Mood and Affect: Mood normal.         Behavior: Behavior normal.                       Assessment and Plan            Hypertension, unspecified type      Orders:    cloNIDine (CATAPRES) 0.1 MG tablet; Take 1 tablet by mouth 2 (Two) Times a Day.    Impaired fasting glucose    Orders:    Semaglutide,0.25 or 0.5MG/DOS, (Ozempic, 0.25 or 0.5 MG/DOSE,) 2 MG/1.5ML solution pen-injector; Inject 0.5 mg under the skin into the appropriate area as directed 1 (One) Time Per Week.    Medicare annual wellness visit, subsequent                 Follow Up   Return in about 6 months (around 5/27/2025), or if symptoms worsen or fail to improve.  Patient was given instructions and counseling regarding his condition or for health maintenance advice. Please see specific information pulled into the AVS if appropriate.

## 2024-12-03 ENCOUNTER — TELEPHONE (OUTPATIENT)
Dept: FAMILY MEDICINE CLINIC | Facility: CLINIC | Age: 49
End: 2024-12-03
Payer: OTHER GOVERNMENT

## 2024-12-05 DIAGNOSIS — R73.01 IMPAIRED FASTING GLUCOSE: ICD-10-CM

## 2024-12-05 RX ORDER — SEMAGLUTIDE 1.34 MG/ML
0.5 INJECTION, SOLUTION SUBCUTANEOUS WEEKLY
Qty: 4.5 ML | Refills: 1 | Status: CANCELLED | OUTPATIENT
Start: 2024-12-05

## 2024-12-05 NOTE — TELEPHONE ENCOUNTER
Per patient he spoke with insurance company. They will be approving appeal PA for Ozempic tomorrow. Per patient the prescription needs to be sent to [facility].

## 2024-12-06 ENCOUNTER — TELEPHONE (OUTPATIENT)
Dept: FAMILY MEDICINE CLINIC | Facility: CLINIC | Age: 49
End: 2024-12-06
Payer: OTHER GOVERNMENT

## 2024-12-06 NOTE — TELEPHONE ENCOUNTER
Per Tre, I will not be able to appeal this now due to Ozempic only being used for DM. Previously we could get it for impaired fasting glucose and insulin resistance but guidelines have been tightened and they will not allow us to use unless patient is diabetic     Replied to patients mychart message.

## 2024-12-06 NOTE — TELEPHONE ENCOUNTER
Caller: [place], Sameer    Relationship: Self    Best call back number: [phone]     What is the best time to reach you: ANYTIME     Who are you requesting to speak with (clinical staff, provider,  specific staff member): CLINICAL     What was the call regarding: PATIENT IS CALLING IN REGARDS TO THE Semaglutide,0.25 or 0.5MG/DOS, (Ozempic, 0.25 or 0.5 MG/DOSE,) 2 MG/1.5ML solution pen-injector , INSURANCE IS STATING THEY ARE NEEDING THE DIAGNOSTICS CODE TO FOLLOW.

## 2024-12-10 ENCOUNTER — OFFICE VISIT (OUTPATIENT)
Dept: FAMILY MEDICINE CLINIC | Facility: CLINIC | Age: 49
End: 2024-12-10
Payer: MEDICARE

## 2024-12-10 VITALS
TEMPERATURE: 97.5 F | DIASTOLIC BLOOD PRESSURE: 92 MMHG | BODY MASS INDEX: 35.96 KG/M2 | HEIGHT: 70 IN | WEIGHT: 251.2 LBS | OXYGEN SATURATION: 98 % | SYSTOLIC BLOOD PRESSURE: 144 MMHG | HEART RATE: 83 BPM

## 2024-12-10 DIAGNOSIS — E11.9 TYPE 2 DIABETES MELLITUS WITHOUT COMPLICATION, UNSPECIFIED WHETHER LONG TERM INSULIN USE: ICD-10-CM

## 2024-12-10 DIAGNOSIS — Z00.00 WELL ADULT EXAM: Primary | ICD-10-CM

## 2024-12-10 PROCEDURE — 1125F AMNT PAIN NOTED PAIN PRSNT: CPT | Performed by: NURSE PRACTITIONER

## 2024-12-10 PROCEDURE — 1159F MED LIST DOCD IN RCRD: CPT | Performed by: NURSE PRACTITIONER

## 2024-12-10 PROCEDURE — 1160F RVW MEDS BY RX/DR IN RCRD: CPT | Performed by: NURSE PRACTITIONER

## 2024-12-10 PROCEDURE — 3080F DIAST BP >= 90 MM HG: CPT | Performed by: NURSE PRACTITIONER

## 2024-12-10 PROCEDURE — 3077F SYST BP >= 140 MM HG: CPT | Performed by: NURSE PRACTITIONER

## 2024-12-10 PROCEDURE — 99213 OFFICE O/P EST LOW 20 MIN: CPT | Performed by: NURSE PRACTITIONER

## 2024-12-10 RX ORDER — SEMAGLUTIDE 1.34 MG/ML
0.5 INJECTION, SOLUTION SUBCUTANEOUS WEEKLY
Qty: 4.5 ML | Refills: 1 | Status: SHIPPED | OUTPATIENT
Start: 2024-12-10

## 2024-12-10 RX ORDER — METOPROLOL SUCCINATE 200 MG/1
100 TABLET, EXTENDED RELEASE ORAL DAILY
COMMUNITY

## 2024-12-10 RX ORDER — ATORVASTATIN CALCIUM 40 MG/1
40 TABLET, FILM COATED ORAL DAILY
COMMUNITY

## 2024-12-10 RX ORDER — TADALAFIL 5 MG/1
5 TABLET ORAL DAILY PRN
COMMUNITY

## 2024-12-10 RX ORDER — FLUTICASONE PROPIONATE 50 MCG
2 SPRAY, SUSPENSION (ML) NASAL DAILY
COMMUNITY

## 2024-12-10 RX ORDER — DULOXETIN HYDROCHLORIDE 30 MG/1
30 CAPSULE, DELAYED RELEASE ORAL DAILY
COMMUNITY

## 2024-12-10 RX ORDER — SEMAGLUTIDE 1.34 MG/ML
0.5 INJECTION, SOLUTION SUBCUTANEOUS WEEKLY
Qty: 4.5 ML | Refills: 1 | Status: SHIPPED | OUTPATIENT
Start: 2024-12-10 | End: 2024-12-10

## 2024-12-10 NOTE — PROGRESS NOTES
"Chief Complaint  DM type 2 and discuss medications patient presents to the office for a follow-up regarding his medications.    Subjective         Sameer Méndez presents to [facility]  Patient does have history of type 2 diabetes and has a history of his blood sugars extremely high..  Patient states that he is eating healthier.  He states it is hard for him to be active due to his chronic back pain.  He is currently walking with a cane.  Patient was previously on Ozempic and tolerated the medicine well which helped lower his A1c.  He denies any other concerns or complaints at this time.  He does state that the clonidine is helping with his blood pressure.  He states that his blood pressure is staying between 130 and 135 systolically.    Obesity       Objective     Vitals:    12/10/24 0824   BP: 144/92   BP Location: Right arm   Patient Position: Sitting   Cuff Size: Adult   Pulse: 83   Temp: 97.5 °F (36.4 °C)   TempSrc: Temporal   SpO2: 98%   Weight: 114 kg (251 lb 3.2 oz)   Height: 177.8 cm (70\")      Body mass index is 36.04 kg/m².             Physical Exam  Vitals reviewed.   Constitutional:       Appearance: Normal appearance.   Cardiovascular:      Rate and Rhythm: Normal rate and regular rhythm.      Pulses: Normal pulses.      Heart sounds: S1 normal and S2 normal. No murmur heard.  Pulmonary:      Effort: Pulmonary effort is normal. No respiratory distress.   Skin:     General: Skin is warm and dry.   Neurological:      Mental Status: He is alert and oriented to person, place, and time.   Psychiatric:         Attention and Perception: Attention normal.         Mood and Affect: Mood normal.         Behavior: Behavior normal.          Result Review :   The following data was reviewed by: STEFFANIE Schmitt on 12/10/2024:      Procedures    Assessment and Plan   Diagnoses and all orders for this visit:    1. Well adult exam (Primary)  -     Vitamin B12; Future  -     Folate; " Future  -     Microalbumin / Creatinine Urine Ratio - Urine, Clean Catch; Future  -     Hemoglobin A1c; Future  -     T4, Free; Future  -     Uric acid; Future  -     Comprehensive Metabolic Panel; Future  -     CBC (No Diff); Future  -     Lipid Panel; Future  -     TSH; Future  -     Urinalysis With Culture If Indicated -; Future    2. Type 2 diabetes mellitus without complication, unspecified whether long term insulin use  -     Microalbumin / Creatinine Urine Ratio - Urine, Clean Catch; Future  -     Hemoglobin A1c; Future  -     Discontinue: Semaglutide,0.25 or 0.5MG/DOS, (Ozempic, 0.25 or 0.5 MG/DOSE,) 2 MG/1.5ML solution pen-injector; Inject 0.5 mg under the skin into the appropriate area as directed 1 (One) Time Per Week.  Dispense: 4.5 mL; Refill: 1  -     Semaglutide,0.25 or 0.5MG/DOS, (Ozempic, 0.25 or 0.5 MG/DOSE,) 2 MG/1.5ML solution pen-injector; Inject 0.5 mg under the skin into the appropriate area as directed 1 (One) Time Per Week.  Dispense: 4.5 mL; Refill: 1          Follow Up   Return in about 6 months (around 6/10/2025) for Recheck.  Patient was given instructions and counseling regarding his condition or for health maintenance advice. Please see specific information pulled into the AVS if appropriate.

## 2024-12-11 ENCOUNTER — HOSPITAL ENCOUNTER (OUTPATIENT)
Dept: OTHER | Facility: HOSPITAL | Age: 49
Discharge: HOME | End: 2024-12-11

## 2024-12-12 ENCOUNTER — HOSPITAL ENCOUNTER (OUTPATIENT)
Dept: OTHER | Facility: HOSPITAL | Age: 49
Discharge: HOME | End: 2024-12-12

## 2024-12-13 ENCOUNTER — TELEPHONE (OUTPATIENT)
Dept: FAMILY MEDICINE CLINIC | Facility: CLINIC | Age: 49
End: 2024-12-13
Payer: OTHER GOVERNMENT

## 2024-12-13 NOTE — TELEPHONE ENCOUNTER
Caller: Sameer Méndez    Relationship to patient: Self      Best call back number: [phone]     Provider: NEIL CORTES needed: OZEMPIC

## 2025-01-22 ENCOUNTER — OFFICE VISIT (OUTPATIENT)
Dept: FAMILY MEDICINE CLINIC | Facility: CLINIC | Age: 50
End: 2025-01-22
Payer: MEDICARE

## 2025-01-22 VITALS
SYSTOLIC BLOOD PRESSURE: 128 MMHG | HEART RATE: 93 BPM | BODY MASS INDEX: 37.05 KG/M2 | OXYGEN SATURATION: 97 % | TEMPERATURE: 97.3 F | HEIGHT: 70 IN | WEIGHT: 258.8 LBS | DIASTOLIC BLOOD PRESSURE: 82 MMHG

## 2025-01-22 DIAGNOSIS — M54.12 CERVICAL RADICULOPATHY: Primary | ICD-10-CM

## 2025-01-22 DIAGNOSIS — I10 PRIMARY HYPERTENSION: ICD-10-CM

## 2025-01-22 DIAGNOSIS — M51.362 DEGENERATION OF INTERVERTEBRAL DISC OF LUMBAR REGION WITH DISCOGENIC BACK PAIN AND LOWER EXTREMITY PAIN: ICD-10-CM

## 2025-01-22 DIAGNOSIS — M47.816 LUMBAR SPONDYLOSIS: ICD-10-CM

## 2025-01-22 DIAGNOSIS — M50.30 DDD (DEGENERATIVE DISC DISEASE), CERVICAL: ICD-10-CM

## 2025-01-22 PROCEDURE — 99213 OFFICE O/P EST LOW 20 MIN: CPT | Performed by: NURSE PRACTITIONER

## 2025-01-22 PROCEDURE — 3074F SYST BP LT 130 MM HG: CPT | Performed by: NURSE PRACTITIONER

## 2025-01-22 PROCEDURE — 1160F RVW MEDS BY RX/DR IN RCRD: CPT | Performed by: NURSE PRACTITIONER

## 2025-01-22 PROCEDURE — 3079F DIAST BP 80-89 MM HG: CPT | Performed by: NURSE PRACTITIONER

## 2025-01-22 PROCEDURE — 1125F AMNT PAIN NOTED PAIN PRSNT: CPT | Performed by: NURSE PRACTITIONER

## 2025-01-22 PROCEDURE — 1159F MED LIST DOCD IN RCRD: CPT | Performed by: NURSE PRACTITIONER

## 2025-01-22 NOTE — PROGRESS NOTES
"Chief Complaint  Back Problem (Update on his MRI and Neurosurgeon. )    Subjective         Sameer Méndez presents to [facility]  Patient presents to the office to follow-up regarding his neck and back pain.  Patient is currently seeing pain management and does have an appointment with neurosurgery next week.  Patient does want to consider medicinal marijuana as a treatment regimen.  He states that he would like to get off as much medication as he can as he does not like the side effects.  Patient also states that he just wants pain relief to be able to do things with his family as well as things such as ADLs without any chronic pain  I did explain to the patient that he would qualify for medicinal marijuana but not currently issuing the cards until the policies are approved.  I explained to the patient that I would need to have a visit with him within 24 hours of issuing the card therefore we had to bring him back in.  Patient verbalizes understanding.    Back Problem       Objective     Vitals:    01/22/25 0730   BP: 128/82   BP Location: Right arm   Patient Position: Sitting   Cuff Size: Adult   Pulse: 93   Temp: 97.3 °F (36.3 °C)   TempSrc: Temporal   SpO2: 97%   Weight: 117 kg (258 lb 12.8 oz)   Height: 177.8 cm (70\")      Body mass index is 37.13 kg/m².             Physical Exam  Vitals reviewed.   Constitutional:       Appearance: Normal appearance.   Cardiovascular:      Rate and Rhythm: Normal rate and regular rhythm.      Pulses: Normal pulses.      Heart sounds: Normal heart sounds, S1 normal and S2 normal. No murmur heard.  Pulmonary:      Effort: Pulmonary effort is normal. No respiratory distress.      Breath sounds: Normal breath sounds.   Musculoskeletal:      Lumbar back: Tenderness present. Decreased range of motion.      Comments: Ambulates with cane   Skin:     General: Skin is warm and dry.   Neurological:      Mental Status: He is alert and oriented to " person, place, and time.   Psychiatric:         Attention and Perception: Attention normal.         Mood and Affect: Mood normal.         Behavior: Behavior normal.          Result Review :   The following data was reviewed by: STEFFANIE Schmitt on 01/22/2025:      Procedures    Assessment and Plan   Diagnoses and all orders for this visit:    1. Cervical radiculopathy (Primary)    2. Lumbar spondylosis    3. Primary hypertension    4. DDD (degenerative disc disease), cervical    5. Degeneration of intervertebral disc of lumbar region with discogenic back pain and lower extremity pain          Follow Up   Return if symptoms worsen or fail to improve.  Patient was given instructions and counseling regarding his condition or for health maintenance advice. Please see specific information pulled into the AVS if appropriate.

## 2025-01-27 DIAGNOSIS — M72.2 PLANTAR FASCIITIS OF LEFT FOOT: ICD-10-CM

## 2025-01-27 DIAGNOSIS — M1A.4720 OTHER SECONDARY CHRONIC GOUT OF LEFT ANKLE WITHOUT TOPHUS: ICD-10-CM

## 2025-01-27 RX ORDER — DICLOFENAC SODIUM 75 MG/1
75 TABLET, DELAYED RELEASE ORAL 2 TIMES DAILY
Qty: 180 TABLET | Refills: 1 | Status: SHIPPED | OUTPATIENT
Start: 2025-01-27

## 2025-01-30 ENCOUNTER — OFFICE VISIT (OUTPATIENT)
Dept: NEUROSURGERY | Facility: CLINIC | Age: 50
End: 2025-01-30
Payer: OTHER GOVERNMENT

## 2025-01-30 VITALS
DIASTOLIC BLOOD PRESSURE: 110 MMHG | SYSTOLIC BLOOD PRESSURE: 186 MMHG | WEIGHT: 263 LBS | HEIGHT: 70 IN | HEART RATE: 81 BPM | BODY MASS INDEX: 37.65 KG/M2

## 2025-01-30 DIAGNOSIS — M47.27 OSTEOARTHRITIS OF SPINE WITH RADICULOPATHY, LUMBOSACRAL REGION: Primary | ICD-10-CM

## 2025-01-30 DIAGNOSIS — Z74.09 IMMOBILITY: ICD-10-CM

## 2025-01-30 NOTE — PROGRESS NOTES
Chief Complaint  Establish Care (NEW PT _OTHER INTERVERTEBRAL DISC DEGENERATION, LUMBAR REGION WITH DISCOGENIC BACK PAIN AND LOWER EXTREMITY PAIN_MRI [facility] 12-12-24//Patient is not currently doing any physical therapy and [facility] but is not anymore. ) and Back Pain (Patient stated the pain is in his lower back and radiated down to his legs causing them to go numb and tingling. He does use a cane and a back brace. He stated that this has been ongoing for years. )    Subjective          Sameer Méndez who is a 49 y.o. year old male who presents to [facility] for evaluation of     History of Present Illness  The patient is a 49-year-old male presenting for back pain.    He has been experiencing chronic low back pain for several years, which has progressively worsened over the past 7 months. The onset of this exacerbation was sudden, occurring while he was reaching for an object on a counter. This incident resulted in immediate back pain and numbness extending to his knees. Despite undergoing physical therapy, including dry needling and TENS, his symptoms have not improved. He reports occasional leg weakness, necessitating the use of a cane. He also experiences numbness in his arms. He has no history of neck or back surgeries. He describes his pain as primarily located in the buttocks, with occasional burning sensations. He has not undergone nerve ablation for his back pain. [facility] due to [facility] issues and difficulty navigating stairs. He reports numbness in the legs typically stopping at the knee, which is more severe on the right side and is accompanied by aching that radiates down to his Achilles tendon. Prolonged standing or bending exacerbates his symptoms. He finds it particularly challenging to get out of bed in the morning. He had been receiving treatment at this clinic for some  "time, including epidurals and trigger point injections, which have ceased to provide relief. His last injection was administered over a year ago. He was previously prescribed gabapentin and another medication, the name of which he can not recall. He is currently taking diclofenac for inflammation and duloxetine. He is not currently taking any muscle relaxants.    Supplemental Information  He is getting over a gout attack.    MEDICATIONS  Current: diclofenac, duloxetine  Past: gabapentin     History of Previous Spinal Surgery?: No    Nicotine use: non-smoker    BMI: Body mass index is 37.74 kg/m².      Review of Systems   Musculoskeletal:  Positive for arthralgias, back pain, gait problem, joint swelling, myalgias, neck pain and neck stiffness.   Neurological:  Positive for weakness and numbness.   All other systems reviewed and are negative.       Objective   Vital Signs:   BP (!) 186/110 (BP Location: Left arm, Patient Position: Sitting, Cuff Size: Adult)   Pulse 81   Ht 177.8 cm (70\")   Wt 119 kg (263 lb)   BMI 37.74 kg/m²       Physical Exam  Vitals reviewed.   Constitutional:       Appearance: Normal appearance.   Musculoskeletal:      Lumbar back: Tenderness present. Negative right straight leg raise test and negative left straight leg raise test.      Right hip: No tenderness. Normal range of motion.      Left hip: No tenderness. Normal range of motion.   Neurological:      Mental Status: He is alert.      Deep Tendon Reflexes:      Reflex Scores:       Patellar reflexes are 0 on the right side and 0 on the left side.       Achilles reflexes are 0 on the right side and 0 on the left side.       Neurological Exam  Mental Status  Alert.    Motor   Strength is 5/5 in all four extremities except as noted.  Weakness in the right foot, primarily limited due to recent gout exacerbation.    Sensory  Sensation is intact to light touch, pinprick, vibration and proprioception in all four extremities.    Reflexes      "                                       Right                      Left  Patellar                                0                         0  Achilles                                0                         0    Right pathological reflexes: Deferred due to ankle pain.  Left pathological reflexes: Ankle clonus absent.    Gait  Casual gait: Antalgic gait.      Physical Exam  There is mild tenderness to palpation in the lower lumbar spine. There is no tenderness to palpation in the hips. There is decreased range of motion in the right ankle.       Result Review :                 Assessment and Plan    Diagnoses and all orders for this visit:    1. Osteoarthritis of spine with radiculopathy, lumbosacral region (Primary)  -     Ambulatory Referral to Pain Management    2. Immobility        Assessment & Plan  1. Chronic low back pain.  The patient's chronic low back pain is likely due to degenerative changes and arthritis with facet hypertrophy at multiple levels throughout the lumbar region. There is no significant spinal stenosis within the lumbar spine, but there is mild narrowing of the spinal canal at L4-5 and a central disc herniation at L5-S1 causing some narrowing on the right side. He has been informed that surgical intervention would not provide substantial relief for his back pain symptoms. Instead, a discussion with pain management about a potential ablation procedure is recommended. This procedure involves nerve blocks on either side of the lower back, targeting three different areas on both sides. If he experiences temporary relief from these nerve blocks, an ablative tool can be used to burn the nerves along the spine on either side of the back. This procedure is performed under anesthesia and is typically a same-day procedure. He has been informed that he may experience soreness during the first week post-procedure, but the goal is to improve his back pain and alleviate some of the sciatic pain he is  experiencing in his thighs. The ablation procedure aims to provide relief for up to 6 months, after which it can be repeated if necessary. Long-term, a spinal cord stimulator device may be considered to provide continuous relief of his pain symptoms. A referral to pain management will be provided to initiate the process for his lower back.    2. Immobility.  He is having difficulty with ambulation and going up and down stairs due to his back pain symptoms. He reports living on the second floor of his home, which is difficult. We would recommend a stair lift for his home to reduce his risk for falls.  .       I spent 42 minutes caring for Sameer on this date of service. This time includes time spent by me in the following activities:preparing for the visit, reviewing tests, obtaining and/or reviewing a separately obtained history, performing a medically appropriate examination and/or evaluation , counseling and educating the patient/family/caregiver, referring and communicating with other health care professionals , documenting information in the medical record, independently interpreting results and communicating that information with the patient/family/caregiver, and care coordination.    Follow Up   Return if symptoms worsen or fail to improve.  Patient was given instructions and counseling regarding his condition or for health maintenance advice.     Patient or patient representative verbalized consent for the use of Ambient Listening during the visit with  STEFFANIE Mayfield for chart documentation. 1/30/2025  15:19 EST

## 2025-01-31 ENCOUNTER — PATIENT ROUNDING (BHMG ONLY) (OUTPATIENT)
Dept: NEUROSURGERY | Facility: CLINIC | Age: 50
End: 2025-01-31
Payer: OTHER GOVERNMENT

## 2025-02-12 ENCOUNTER — OFFICE VISIT (OUTPATIENT)
Dept: FAMILY MEDICINE CLINIC | Facility: CLINIC | Age: 50
End: 2025-02-12
Payer: MEDICARE

## 2025-02-12 VITALS
DIASTOLIC BLOOD PRESSURE: 98 MMHG | HEART RATE: 80 BPM | WEIGHT: 258.8 LBS | HEIGHT: 70 IN | SYSTOLIC BLOOD PRESSURE: 142 MMHG | OXYGEN SATURATION: 98 % | BODY MASS INDEX: 37.05 KG/M2 | TEMPERATURE: 98.5 F | RESPIRATION RATE: 19 BRPM

## 2025-02-12 DIAGNOSIS — E11.9 TYPE 2 DIABETES MELLITUS WITHOUT COMPLICATION, UNSPECIFIED WHETHER LONG TERM INSULIN USE: ICD-10-CM

## 2025-02-12 DIAGNOSIS — M72.2 PLANTAR FASCIITIS OF LEFT FOOT: ICD-10-CM

## 2025-02-12 DIAGNOSIS — I10 HYPERTENSION, UNSPECIFIED TYPE: ICD-10-CM

## 2025-02-12 DIAGNOSIS — M1A.4720 OTHER SECONDARY CHRONIC GOUT OF LEFT ANKLE WITHOUT TOPHUS: ICD-10-CM

## 2025-02-12 PROCEDURE — 99214 OFFICE O/P EST MOD 30 MIN: CPT | Performed by: NURSE PRACTITIONER

## 2025-02-12 PROCEDURE — 3080F DIAST BP >= 90 MM HG: CPT | Performed by: NURSE PRACTITIONER

## 2025-02-12 PROCEDURE — 1125F AMNT PAIN NOTED PAIN PRSNT: CPT | Performed by: NURSE PRACTITIONER

## 2025-02-12 PROCEDURE — 1160F RVW MEDS BY RX/DR IN RCRD: CPT | Performed by: NURSE PRACTITIONER

## 2025-02-12 PROCEDURE — 3077F SYST BP >= 140 MM HG: CPT | Performed by: NURSE PRACTITIONER

## 2025-02-12 PROCEDURE — 1159F MED LIST DOCD IN RCRD: CPT | Performed by: NURSE PRACTITIONER

## 2025-02-12 RX ORDER — CLONIDINE HYDROCHLORIDE 0.1 MG/1
0.1 TABLET ORAL 2 TIMES DAILY
Qty: 180 TABLET | Refills: 1 | Status: SHIPPED | OUTPATIENT
Start: 2025-02-12

## 2025-02-12 RX ORDER — DICLOFENAC SODIUM 75 MG/1
75 TABLET, DELAYED RELEASE ORAL 2 TIMES DAILY
Qty: 180 TABLET | Refills: 1 | Status: SHIPPED | OUTPATIENT
Start: 2025-02-12

## 2025-02-12 RX ORDER — SEMAGLUTIDE 1.34 MG/ML
1 INJECTION, SOLUTION SUBCUTANEOUS WEEKLY
Qty: 3 ML | Refills: 5 | Status: SHIPPED | OUTPATIENT
Start: 2025-02-12

## 2025-02-12 NOTE — PROGRESS NOTES
"Chief Complaint  Discuss medicinal cannabis    Subjective         Sameer Méndez presents to [facility]  Patient presents to the office to discuss medical marijuana.  We have discussed the risk and benefits of this alternative therapy.  I did also encourage the patient to discuss this with his pain management and his pharmacist to ensure that there is no medications that are going to interact.  Verbalizes understanding.  Patient also states that he is needing refills on his diclofenac, clonidine.  Patient does state that he would like to increase his Ozempic to 1 mg weekly.  He states that he is tolerating this medication very well.         Objective     Vitals:    02/12/25 0710   BP: 142/98   Pulse: 80   Resp: 19   Temp: 98.5 °F (36.9 °C)   SpO2: 98%   Weight: 117 kg (258 lb 12.8 oz)   Height: 177.8 cm (70\")      Body mass index is 37.13 kg/m².             Physical Exam  Vitals reviewed.   Constitutional:       Appearance: Normal appearance.   Cardiovascular:      Rate and Rhythm: Normal rate and regular rhythm.      Pulses: Normal pulses.      Heart sounds: Normal heart sounds, S1 normal and S2 normal. No murmur heard.  Pulmonary:      Effort: Pulmonary effort is normal. No respiratory distress.      Breath sounds: Normal breath sounds.   Skin:     General: Skin is warm and dry.   Neurological:      Mental Status: He is alert and oriented to person, place, and time.   Psychiatric:         Attention and Perception: Attention normal.         Mood and Affect: Mood normal.         Behavior: Behavior normal.          Result Review :   The following data was reviewed by: STEFFANIE Schmitt on 02/12/2025:      Procedures    Assessment and Plan   Diagnoses and all orders for this visit:    1. Hypertension, unspecified type  -     cloNIDine (CATAPRES) 0.1 MG tablet; Take 1 tablet by mouth 2 (Two) Times a Day.  Dispense: 180 tablet; Refill: 1    2. 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  Dispense: 180 tablet; Refill: 1    3. Plantar fasciitis of left foot  -     diclofenac (VOLTAREN) 75 MG EC tablet; Take 1 tablet by mouth 2 (Two) Times a Day.  Dispense: 180 tablet; Refill: 1    4. Type 2 diabetes mellitus without complication, unspecified whether long term insulin use  -     Semaglutide, 1 MG/DOSE, (Ozempic, 1 MG/DOSE,) 4 MG/3ML solution pen-injector; Inject 1 mg under the skin into the appropriate area as directed 1 (One) Time Per Week.  Dispense: 3 mL; Refill: 5        Medical cannabis certification  UDS/contract signed, Kevin reviewed.  We will provide medical marijuana certification for the patient on the [place] website portal.  Patient was given instructions to follow steps on website to create an account. Patient was advised to review all the rules and regulation and process on [place] website, [employer].gov. Patient is responsible to f/u rules and regulations of [facility] regarding Cannabis use.     Aware of the risk and long-term outcome of marijuana use, education materials provided for patient to review.  I have reviewed patient's medical history; patient has been my patient for sometimes now. After reviewing patient's history and conditions, I have no objection for patient using marijuana.  I do believe that patient may receive therapeutic benefits from the use of medical cannabis. I will go ahead and give patient certification, but I do not encourage patient due to long-term risk and drug-drug interactions. I have discussed other safer options with patient to treat current symptoms.  Patient is aware of all the options and agrees with the risk and certification.     Patient is aware to review everything on following website.  [employer].gov.  Patient to finish all the process within 60 days of certification. Certification will be valid for 1 year.  [place] office of  medical cannabis support line # [phone]     Follow Up   Return for Next scheduled follow up.  Patient was given instructions and counseling regarding his condition or for health maintenance advice. Please see specific information pulled into the AVS if appropriate.

## 2025-02-18 ENCOUNTER — OFFICE VISIT (OUTPATIENT)
Dept: NEUROSURGERY | Facility: CLINIC | Age: 50
End: 2025-02-18
Payer: OTHER GOVERNMENT

## 2025-02-18 VITALS
HEART RATE: 90 BPM | SYSTOLIC BLOOD PRESSURE: 172 MMHG | DIASTOLIC BLOOD PRESSURE: 99 MMHG | BODY MASS INDEX: 36.42 KG/M2 | RESPIRATION RATE: 17 BRPM | OXYGEN SATURATION: 100 % | HEIGHT: 70 IN | WEIGHT: 254.4 LBS

## 2025-02-18 DIAGNOSIS — Z74.09 IMMOBILITY: ICD-10-CM

## 2025-02-18 DIAGNOSIS — G95.89 CERVICAL CORD MYELOMALACIA: ICD-10-CM

## 2025-02-18 DIAGNOSIS — R26.89 IMBALANCE: ICD-10-CM

## 2025-02-18 DIAGNOSIS — M48.02 SPINAL STENOSIS IN CERVICAL REGION: Primary | ICD-10-CM

## 2025-02-18 NOTE — PROGRESS NOTES
Chief Complaint  Follow-up (Spinal stenosis in cervical region), Leg Pain (Right ankle GOUT), and Back Pain (Stiffness in back)    Subjective          Sameer Méndez who is a 49 y.o. year old male who presents to [facility] for cervical spine.     History of Present Illness  The patient is a 49-year-old male presenting for neck pain.    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 These injuries have resulted in several instances of neck jamming. His job involved frequent running up and down the decks with heavy chains on his shoulders, leading to gradual wear and tear over the years. Despite these injuries, he continued to work without fully comprehending the severity of his condition. Upon detention, he began to experience significant discomfort, often waking up with severe neck pain. He also reports radiating pain and numbness extending from his neck down to his hands, occasionally accompanied by sweating and swelling in his hands. The pain extends into his fingers. He also experiences arm numbness, which he describes as extremely distressing. His symptoms are most pronounced in the morning, often disrupting his sleep. He avoids sleeping in cold environments due to the associated stiffness. His symptoms persist throughout the day, with some relief provided by hot showers. He has a collection of heating pads that he uses for comfort. He also reports arm weakness, which he first noticed when attempting to lift an object, resulting in arm shaking. He is uncertain whether this weakness is related to his neck condition or his carpal tunnel syndrome. He was informed 6 years ago that his condition would likely progress. He has undergone 2 nerve studies, one at [place] and another at the [facility] in [place]. He has received injections for his carpal tunnel syndrome.  He was previously advised to undergo surgery for his carpal tunnel syndrome, but he declined. He was also referred to a pain specialist for his neck condition. He reports that his neck pain radiates to his shoulders and has been increasing in frequency. He has received epidurals, trigger point injections, dry needling, and acupuncture for his neck pain. He found aqua therapy to be particularly beneficial. He is currently on Ozempic for weight management. He also reports occasional bowel and bladder issues when his pain is severe.    Supplemental Information  He has plantar fasciitis.    MEDICATIONS  Current: Ozempic       Interval History 1/30/25    The patient is a 49-year-old male presenting for back pain.     He has been experiencing chronic low back pain for several years, which has progressively worsened over the past 7 months. The onset of this exacerbation was sudden, occurring while he was reaching for an object on a counter. This incident resulted in immediate back pain and numbness extending to his knees. Despite undergoing physical therapy, including dry needling and TENS, his symptoms have not improved. He reports occasional leg weakness, necessitating the use of a cane. He also experiences numbness in his arms. He has no history of neck or back surgeries. He describes his pain as primarily located in the buttocks, with occasional burning sensations. He has not undergone nerve ablation for his back pain. [facility] due to balance issues and difficulty navigating stairs. He reports numbness in the legs typically stopping at the knee, which is more severe on the right side and is accompanied by aching that radiates down to his Achilles tendon. Prolonged standing or bending exacerbates his symptoms. He finds it particularly challenging to get out of bed in the morning. He had been receiving treatment at this clinic for some time, including epidurals and trigger  "point injections, which have ceased to provide relief. His last injection was administered over a year ago. He was previously prescribed gabapentin and another medication, the name of which he can not recall. He is currently taking diclofenac for inflammation and duloxetine. He is not currently taking any muscle relaxants.     Supplemental Information  He is getting over a gout attack.     MEDICATIONS  Current: diclofenac, duloxetine  Past: gabapentin     History of Previous Spinal Surgery?: No     Nicotine use: non-smoker     BMI: Body mass index is 37.74 kg/m².     Recent Interventions: See above      Review of Systems   Musculoskeletal:  Positive for arthralgias, back pain, gait problem, myalgias, neck pain and neck stiffness.   Neurological:  Positive for weakness and numbness.   All other systems reviewed and are negative.       Objective   Vital Signs:   /99 (BP Location: Right arm, Patient Position: Sitting, Cuff Size: Large Adult)   Pulse 90   Resp 17   Ht 177.8 cm (70\")   Wt 115 kg (254 lb 6.4 oz)   SpO2 100%   BMI 36.50 kg/m²       Physical Exam  Vitals reviewed.   Constitutional:       Appearance: Normal appearance.   Musculoskeletal:      Right shoulder: No tenderness. Normal range of motion.      Left shoulder: No tenderness. Normal range of motion.      Cervical back: Tenderness present. Pain with movement present. Decreased range of motion.   Neurological:      Mental Status: He is alert.      Motor: Motor strength is normal.     Deep Tendon Reflexes:      Reflex Scores:       Patellar reflexes are 0 on the right side and 0 on the left side.       Achilles reflexes are 0 on the right side and 0 on the left side.       Neurological Exam  Mental Status  Alert.    Motor   Strength is 5/5 throughout all four extremities.    Sensory  Sensation is intact to light touch, pinprick, vibration and proprioception in all four extremities.    Reflexes                                            Right    "                   Left  Patellar                                0                         0  Achilles                                0                         0    Gait  Casual gait: Antalgic gait. Unsteady.      Physical Exam         Result Review :       Data reviewed : [facility] Multilevel spondylosis with reversal of the cervical lordosis. Multilevel spinal stenosis, most significant at C4/5 and C5/6 where there is severe spinal stenosis. At C3/4 and C6/7 there is moderate spinal stenosis, which has slightly progressed. There is focal contour deformity of the cord at C3/4 and C6/7.            Assessment and Plan    Diagnoses and all orders for this visit:    1. Spinal stenosis in cervical region (Primary)    2. Cervical cord myelomalacia    3. Immobility    4. Imbalance        Assessment & Plan  1. Cervical spinal stenosis.  The MRI reveals significant spinal stenosis throughout the entire cervical spine, with degeneration of disks at every level, disc bulges, and arthritis changes contributing to the stenosis. There is concern for potential spinal cord damage at C3-4 and C6-7, with abnormality within the spinal cord itself. The patient reports pain, numbness, and weakness in the arms, which is more likely related to the neck issues than carpal tunnel syndrome. No abnormal reflexes were observed during the physical examination suggesting active cord compression. Surgical intervention, such as cervical fusion or decompression, may be necessary to prevent further spinal cord damage. A consultation with Dr. Soto is recommended to discuss potential surgical options.    2. Carpal tunnel syndrome.  The patient has a history of carpal tunnel syndrome, which contributes to the numbness and weakness in the hands. He has not had any recent nerve studies or injections for this condition.       I spent 40 minutes caring for Sameer on this date of service.  This time includes time spent by me in the following activities:preparing for the visit, reviewing tests, obtaining and/or reviewing a separately obtained history, performing a medically appropriate examination and/or evaluation , counseling and educating the patient/family/caregiver, documenting information in the medical record, independently interpreting results and communicating that information with the patient/family/caregiver, and care coordination.    Follow Up   Return if symptoms worsen or fail to improve.  Patient was given instructions and counseling regarding his condition or for health maintenance advice.     Patient or patient representative verbalized consent for the use of Ambient Listening during the visit with  STEFFANIE Mayfield for chart documentation. 2/20/2025  10:20 EST    He could consider an ACDF at C3-4 and C4-5 as an initial procedure. Followed by a subsequent C5-6 and C6-7 ACDF. Posterior surgery would not be recommended secondary to the kyphosis of the spine.     He reports balance issues and would likely benefit from a stair lift in his home to lower his risk of injury.

## 2025-02-24 ENCOUNTER — TELEPHONE (OUTPATIENT)
Dept: FAMILY MEDICINE CLINIC | Facility: CLINIC | Age: 50
End: 2025-02-24
Payer: OTHER GOVERNMENT

## 2025-02-24 NOTE — TELEPHONE ENCOUNTER
"Relay     \"Lvm for patient - labs need to be done before a PA can be done due to them wanting a A1c to show type 2 diabetes. \"                  "

## 2025-02-24 NOTE — TELEPHONE ENCOUNTER
Caller: Sameer Méndez    Relationship: Self    Best call back number: [phone]     What medication are you requesting: Semaglutide, 1 MG/DOSE, (Ozempic, 1 MG/DOSE,) 4 MG/3ML solution pen-injector       If a prescription is needed, what is your preferred pharmacy and phone number: [address] [phone] [facility] [phone] [facility]     Additional notes:PATIENT CHANGED PHARMACY AND NEEDS A NEW PRIOR AUTHORIZATION

## 2025-03-06 DIAGNOSIS — E11.9 TYPE 2 DIABETES MELLITUS WITHOUT COMPLICATION, UNSPECIFIED WHETHER LONG TERM INSULIN USE: ICD-10-CM

## 2025-03-07 RX ORDER — SEMAGLUTIDE 1.34 MG/ML
1 INJECTION, SOLUTION SUBCUTANEOUS WEEKLY
Qty: 3 ML | Refills: 5 | Status: SHIPPED | OUTPATIENT
Start: 2025-03-07

## 2025-06-06 ENCOUNTER — TELEPHONE (OUTPATIENT)
Dept: FAMILY MEDICINE CLINIC | Facility: CLINIC | Age: 50
End: 2025-06-06
Payer: OTHER GOVERNMENT

## 2025-07-03 ENCOUNTER — PATIENT ROUNDING (BHMG ONLY) (OUTPATIENT)
Dept: URGENT CARE | Facility: CLINIC | Age: 50
End: 2025-07-03
Payer: OTHER GOVERNMENT

## 2025-07-03 NOTE — ED NOTES
Thank you for letting us care for you in your recent visit to our urgent care center. We would love to hear about your experience with us. Was this the first time you have visited our location?    We're always looking for ways to make our patients' experiences even better. Do you have any recommendations on ways we may improve?     I appreciate you taking the time to respond. Please be on the lookout for a survey about your recent visit from [employer] via text or email. We would greatly appreciate if you could fill that out and turn it back in. We want your voice to be heard and we value your feedback.   Thank you for choosing [facility] for your healthcare needs.

## 2025-07-15 ENCOUNTER — OFFICE VISIT (OUTPATIENT)
Dept: FAMILY MEDICINE CLINIC | Facility: CLINIC | Age: 50
End: 2025-07-15
Payer: MEDICARE

## 2025-07-15 VITALS
WEIGHT: 233.8 LBS | DIASTOLIC BLOOD PRESSURE: 68 MMHG | HEART RATE: 78 BPM | SYSTOLIC BLOOD PRESSURE: 132 MMHG | OXYGEN SATURATION: 98 % | HEIGHT: 70 IN | TEMPERATURE: 96.7 F | BODY MASS INDEX: 33.47 KG/M2

## 2025-07-15 DIAGNOSIS — E11.9 TYPE 2 DIABETES MELLITUS WITHOUT COMPLICATION, UNSPECIFIED WHETHER LONG TERM INSULIN USE: ICD-10-CM

## 2025-07-15 DIAGNOSIS — M10.9 GOUT, UNSPECIFIED CAUSE, UNSPECIFIED CHRONICITY, UNSPECIFIED SITE: ICD-10-CM

## 2025-07-15 DIAGNOSIS — Z00.00 MEDICARE ANNUAL WELLNESS VISIT, SUBSEQUENT: ICD-10-CM

## 2025-07-15 DIAGNOSIS — I10 PRIMARY HYPERTENSION: ICD-10-CM

## 2025-07-15 DIAGNOSIS — L30.9 ECZEMA, UNSPECIFIED TYPE: ICD-10-CM

## 2025-07-15 DIAGNOSIS — E78.5 HYPERLIPIDEMIA, UNSPECIFIED HYPERLIPIDEMIA TYPE: ICD-10-CM

## 2025-07-15 DIAGNOSIS — Z12.5 SCREENING FOR PROSTATE CANCER: Primary | ICD-10-CM

## 2025-07-15 DIAGNOSIS — E29.1 HYPOGONADISM MALE: ICD-10-CM

## 2025-07-15 PROCEDURE — G0439 PPPS, SUBSEQ VISIT: HCPCS | Performed by: NURSE PRACTITIONER

## 2025-07-15 PROCEDURE — 3078F DIAST BP <80 MM HG: CPT | Performed by: NURSE PRACTITIONER

## 2025-07-15 PROCEDURE — 99214 OFFICE O/P EST MOD 30 MIN: CPT | Performed by: NURSE PRACTITIONER

## 2025-07-15 PROCEDURE — 96160 PT-FOCUSED HLTH RISK ASSMT: CPT | Performed by: NURSE PRACTITIONER

## 2025-07-15 PROCEDURE — 1170F FXNL STATUS ASSESSED: CPT | Performed by: NURSE PRACTITIONER

## 2025-07-15 PROCEDURE — 3075F SYST BP GE 130 - 139MM HG: CPT | Performed by: NURSE PRACTITIONER

## 2025-07-15 PROCEDURE — 1125F AMNT PAIN NOTED PAIN PRSNT: CPT | Performed by: NURSE PRACTITIONER

## 2025-07-15 RX ORDER — ATORVASTATIN CALCIUM 40 MG/1
40 TABLET, FILM COATED ORAL DAILY
Qty: 90 TABLET | Refills: 1 | Status: SHIPPED | OUTPATIENT
Start: 2025-07-15

## 2025-07-15 RX ORDER — AMLODIPINE BESYLATE 10 MG/1
10 TABLET ORAL DAILY
Qty: 90 TABLET | Refills: 1 | Status: SHIPPED | OUTPATIENT
Start: 2025-07-15

## 2025-07-15 RX ORDER — COLCHICINE 0.6 MG/1
0.6 TABLET ORAL DAILY
Qty: 90 TABLET | Refills: 1 | Status: SHIPPED | OUTPATIENT
Start: 2025-07-15

## 2025-07-15 RX ORDER — TRIAMCINOLONE ACETONIDE 1 MG/G
1 CREAM TOPICAL 2 TIMES DAILY
Qty: 45 G | Refills: 1 | Status: SHIPPED | OUTPATIENT
Start: 2025-07-15

## 2025-07-15 RX ORDER — CLONIDINE HYDROCHLORIDE 0.1 MG/1
0.1 TABLET ORAL 2 TIMES DAILY
Qty: 180 TABLET | Refills: 1 | Status: SHIPPED | OUTPATIENT
Start: 2025-07-15

## 2025-07-15 RX ORDER — SEMAGLUTIDE 1.34 MG/ML
1 INJECTION, SOLUTION SUBCUTANEOUS WEEKLY
Qty: 3 ML | Refills: 5 | Status: SHIPPED | OUTPATIENT
Start: 2025-07-15

## 2025-07-15 NOTE — ASSESSMENT & PLAN NOTE
Orders:    cloNIDine (CATAPRES) 0.1 MG tablet; Take 1 tablet by mouth 2 (Two) Times a Day.    amLODIPine (NORVASC) 10 MG tablet; Take 1 tablet by mouth Daily.    CBC (No Diff); Future    Comprehensive Metabolic Panel; Future    Lipid Panel; Future

## 2025-07-15 NOTE — ASSESSMENT & PLAN NOTE
{Hypertension is (optional):[id number]}    Orders:    cloNIDine (CATAPRES) 0.1 MG tablet; Take 1 tablet by mouth 2 (Two) Times a Day.

## 2025-07-15 NOTE — PROGRESS NOTES
Subjective   The ABCs of the Annual Wellness Visit  Medicare Wellness Visit      Sameer Méndez is a 50 y.o. patient who presents for a Medicare Wellness Visit.    The following portions of the patient's history were reviewed and   updated as appropriate: allergies, current medications, past family history, past medical history, past social history, past surgical history, and problem list.    Compared to one year ago, the patient's physical   health is the same.  Compared to one year ago, the patient's mental   health is the same.    Recent Hospitalizations:  He was not admitted to the hospital during the last year.     Current Medical Providers:  Patient Care Team:  Tre Hdz APRN as PCP - General (Nurse Practitioner)  Maine Greenberg APRN as Nurse Practitioner (Nurse Practitioner)  Jayde Dalton APRN as Nurse Practitioner (Nurse Practitioner)    Outpatient Medications Prior to Visit   Medication Sig Dispense Refill    Cholecalciferol 25 MCG (1000 UT) capsule Take 2 capsules by mouth Daily.      diclofenac (VOLTAREN) 75 MG EC tablet Take 1 tablet by mouth 2 (Two) Times a Day. 180 tablet 1    DULoxetine (CYMBALTA) 30 MG capsule Take 1 capsule by mouth Daily.      febuxostat (ULORIC) 40 MG tablet Take 1 tablet by mouth Daily.      fluticasone (FLONASE) 50 MCG/ACT nasal spray Administer 2 sprays into the nostril(s) as directed by provider Daily.      melatonin 3-10 MG tablet tablet Take 1 tablet by mouth At Night As Needed.      metoprolol succinate XL (TOPROL-XL) 200 MG 24 hr tablet Take 0.5 tablets by mouth Daily.      tadalafil (CIALIS) 5 MG tablet Take 1 tablet by mouth Daily As Needed for Erectile Dysfunction.      ubrogepant (UBRELVY) 100 MG tablet Take  by mouth.      valsartan (DIOVAN) 160 MG tablet Take 1 tablet by mouth Daily.      amLODIPine (NORVASC) 10 MG tablet TAKE 1 TABLET BY MOUTH DAILY 90 tablet 1    atorvastatin (LIPITOR) 40 MG tablet Take 1 tablet by mouth Daily.       "cloNIDine (CATAPRES) 0.1 MG tablet Take 1 tablet by mouth 2 (Two) Times a Day. 180 tablet 1    colchicine 0.6 MG tablet Take 1 tablet by mouth Daily. 90 tablet 1    linaclotide (Linzess) 145 MCG capsule capsule Take 1 capsule by mouth Daily As Needed (constipation). 90 capsule 0    Semaglutide, 1 MG/DOSE, (Ozempic, 1 MG/DOSE,) 4 MG/3ML solution pen-injector Inject 1 mg under the skin into the appropriate area as directed 1 (One) Time Per Week. 3 mL 5    triamcinolone (KENALOG) 0.1 % cream Apply 1 application topically to the appropriate area as directed 2 (Two) Times a Day. 45 g 1     No facility-administered medications prior to visit.     No opioid medication identified on active medication list. I have reviewed chart for other potential  high risk medication/s and harmful drug interactions in the elderly.      Aspirin is not on active medication list.  Aspirin use is not indicated based on review of current medical condition/s. Risk of harm outweighs potential benefits.  .    Patient Active Problem List   Diagnosis    Cervical radiculopathy    Cervical spondylosis    DDD (degenerative disc disease), cervical    Degeneration of lumbar intervertebral disc    Lumbar spondylosis    Displacement of lumbar intervertebral disc without myelopathy    Gout    Hypertension    Shoulder pain    Neck pain    Low back pain    Foot pain    Screening for malignant neoplasm of colon     Advance Care Planning Advance Directive is not on file.  ACP discussion was declined by the patient. Patient does not have an advance directive, declines further assistance.            Objective   Vitals:    07/15/25 1457   BP: 132/68   BP Location: Right arm   Patient Position: Sitting   Cuff Size: Adult   Pulse: 78   Temp: 96.7 °F (35.9 °C)   TempSrc: Temporal   SpO2: 98%   Weight: 106 kg (233 lb 12.8 oz)   Height: 177.8 cm (70\")   PainSc: 7    PainLoc: Generalized       Estimated body mass index is 33.55 kg/m² as calculated from the following:   " " Height as of this encounter: 177.8 cm (70\").    Weight as of this encounter: 106 kg (233 lb 12.8 oz).                Does the patient have evidence of cognitive impairment? No                                                                                                Health  Risk Assessment    Smoking Status:  Social History     Tobacco Use   Smoking Status Never    Passive exposure: Never   Smokeless Tobacco Never     Alcohol Consumption:  Social History     Substance and Sexual Activity   Alcohol Use Not Currently       Fall Risk Screen  CHRISADI Fall Risk Assessment was completed, and patient is at HIGH risk for falls. Assessment completed on:7/15/2025    Depression Screening   Little interest or pleasure in doing things? Nearly every day   Feeling down, depressed, or hopeless? Nearly every day   PHQ-2 Total Score 6   Trouble falling or staying asleep, or sleeping too much? Nearly every day   Feeling tired or having little energy? Nearly every day   Poor appetite or overeating? Not at all   Feeling bad about yourself - or that you are a failure or have let yourself or your family down? Not at all   Trouble concentrating on things, such as reading the newspaper or watching television? Several days   Moving or speaking so slowly that other people could have noticed? Or the opposite - being so fidgety or restless that you have been moving around a lot more than usual? Several days     Thoughts that you would be better off dead, or of hurting yourself in some way? Not at all   PHQ-9 Total Score 14   If you checked off any problems, how difficult have these problems made it for you to do your work, take care of things at home, or get along with other people? Somewhat difficult        Health Habits and Functional and Cognitive Screenin/15/2025     3:00 PM   Functional & Cognitive Status   Do you have difficulty preparing food and eating? No   Do you have difficulty bathing yourself, getting dressed or " grooming yourself? Yes   Do you have difficulty using the toilet? Yes   Do you have difficulty moving around from place to place? Yes   Do you have trouble with steps or getting out of a bed or a chair? Yes   Current Diet Well Balanced Diet   Dental Exam Up to date   Eye Exam Not up to date   Exercise (times per week) 2 times per week   Current Exercises Include Other;Swimming   Do you need help using the phone?  No   Are you deaf or do you have serious difficulty hearing?  Yes   Do you need help to go to places out of walking distance? No   Do you need help shopping? No   Do you need help preparing meals?  No   Do you need help with housework?  No   Do you need help with laundry? No   Do you need help taking your medications? No   Do you need help managing money? No   Do you ever drive or ride in a car without wearing a seat belt? No   Have you felt unusual fatigue (could be tiredness), stress, anger or loneliness in the last month? Yes   Who do you live with? Spouse   If you need help, do you have trouble finding someone available to you? No   Have you been bothered in the last four weeks by sexual problems? No   Do you have difficulty concentrating, remembering or making decisions? No           Age-appropriate Screening Schedule:  Refer to the list below for future screening recommendations based on patient's age, sex and/or medical conditions. Orders for these recommended tests are listed in the plan section. The patient has been provided with a written plan.    Health Maintenance List  Health Maintenance   Topic Date Due    DIABETIC FOOT EXAM  Never done    URINE MICROALBUMIN-CREATININE RATIO (uACR)  Never done    Pneumococcal Vaccine 50+ (1 of 2 - PCV) Never done    DIABETIC EYE EXAM  01/12/2023    HEMOGLOBIN A1C  03/19/2023    LIPID PANEL  03/24/2023    COVID-19 Vaccine (1 - 2024-25 season) 07/29/2025 (Originally 9/1/2024)    INFLUENZA VACCINE  10/01/2025    ANNUAL WELLNESS VISIT  07/15/2026    COLORECTAL  "CANCER SCREENING  12/29/2032    TDAP/TD VACCINES (8 - Td or Tdap) 04/01/2034    HEPATITIS C SCREENING  Completed    Hepatitis B  Completed    Orthopox/Mpox  Completed    ZOSTER VACCINE  Completed                                                                                                                                                CMS Preventative Services Quick Reference  Risk Factors Identified During Encounter  Chronic Pain: sees pain management and neurosurgery     The above risks/problems have been discussed with the patient.  Pertinent information has been shared with the patient in the After Visit Summary.  An After Visit Summary and PPPS were made available to the patient.    Follow Up:   Next Medicare Wellness visit to be scheduled in 1 year.         Additional E&M Note during same encounter follows:  Patient has additional, significant, and separately identifiable condition(s)/problem(s) that require work above and beyond the Medicare Wellness Visit     Chief Complaint  Medicare Wellness-subsequent and Diabetes (6 month follow up)    Subjective   HPI    Presents to the office for 6-month follow-up.  Blood pressure is 132/68.  Denies chest pain shortness breath palpitations this time.  Explained he is due for routine lab work.  He states he will get this done at his earliest convenience.  He does state that he is needing refills as well.  Patient is considering cervical spine surgery with Dr. Soto.  He states that he is weighing the pros other cons and try and decide if he wants to move forward with this.  She does continue to struggle with chronic neck and back pain.              Objective   Vital Signs:  /68 (BP Location: Right arm, Patient Position: Sitting, Cuff Size: Adult)   Pulse 78   Temp 96.7 °F (35.9 °C) (Temporal)   Ht 177.8 cm (70\")   Wt 106 kg (233 lb 12.8 oz)   SpO2 98%   BMI 33.55 kg/m²   Physical Exam  Vitals reviewed.   Constitutional:       Appearance: Normal " appearance.   Cardiovascular:      Rate and Rhythm: Normal rate and regular rhythm.      Pulses: Normal pulses.      Heart sounds: Normal heart sounds, S1 normal and S2 normal. No murmur heard.  Pulmonary:      Effort: Pulmonary effort is normal. No respiratory distress.      Breath sounds: Normal breath sounds.   Skin:     General: Skin is warm and dry.   Neurological:      Mental Status: He is alert and oriented to person, place, and time.   Psychiatric:         Attention and Perception: Attention normal.         Mood and Affect: Mood normal.         Behavior: Behavior normal.                    Assessment and Plan      Gout, unspecified cause, unspecified chronicity, unspecified site    Orders:    colchicine 0.6 MG tablet; Take 1 tablet by mouth Daily.    Type 2 diabetes mellitus without complication, unspecified whether long term insulin use      Orders:    Semaglutide, 1 MG/DOSE, (Ozempic, 1 MG/DOSE,) 4 MG/3ML solution pen-injector; Inject 1 mg under the skin into the appropriate area as directed 1 (One) Time Per Week.    Lipid Panel; Future    TSH; Future    Eczema, unspecified type    Orders:    triamcinolone (KENALOG) 0.1 % cream; Apply 1 Application topically to the appropriate area as directed 2 (Two) Times a Day.    Primary hypertension      Orders:    cloNIDine (CATAPRES) 0.1 MG tablet; Take 1 tablet by mouth 2 (Two) Times a Day.    amLODIPine (NORVASC) 10 MG tablet; Take 1 tablet by mouth Daily.    CBC (No Diff); Future    Comprehensive Metabolic Panel; Future    Lipid Panel; Future    Screening for prostate cancer    Orders:    PSA SCREENING; Future    Hyperlipidemia, unspecified hyperlipidemia type       Orders:    atorvastatin (LIPITOR) 40 MG tablet; Take 1 tablet by mouth Daily.    Hypogonadism male    Orders:    Testosterone; Future    Medicare annual wellness visit, subsequent                 Follow Up   Return in about 6 months (around 1/15/2026), or if symptoms worsen or fail to improve, for  Recheck.  Patient was given instructions and counseling regarding his condition or for health maintenance advice. Please see specific information pulled into the AVS if appropriate.

## 2025-08-05 ENCOUNTER — LAB (OUTPATIENT)
Dept: LAB | Facility: HOSPITAL | Age: 50
End: 2025-08-05
Payer: MEDICARE

## 2025-08-05 DIAGNOSIS — Z00.00 WELL ADULT EXAM: ICD-10-CM

## 2025-08-05 DIAGNOSIS — Z12.5 SCREENING FOR PROSTATE CANCER: ICD-10-CM

## 2025-08-05 DIAGNOSIS — I10 PRIMARY HYPERTENSION: ICD-10-CM

## 2025-08-05 DIAGNOSIS — E11.9 TYPE 2 DIABETES MELLITUS WITHOUT COMPLICATION, UNSPECIFIED WHETHER LONG TERM INSULIN USE: ICD-10-CM

## 2025-08-05 DIAGNOSIS — E29.1 HYPOGONADISM MALE: ICD-10-CM

## 2025-08-05 LAB
ALBUMIN SERPL-MCNC: 4.6 G/DL (ref 3.5–5.2)
ALBUMIN UR-MCNC: 6.2 MG/DL
ALBUMIN/GLOB SERPL: 1.6 G/DL
ALP SERPL-CCNC: 87 U/L (ref 39–117)
ALT SERPL W P-5'-P-CCNC: 21 U/L (ref 1–41)
ANION GAP SERPL CALCULATED.3IONS-SCNC: 9.9 MMOL/L (ref 5–15)
AST SERPL-CCNC: 27 U/L (ref 1–40)
BILIRUB SERPL-MCNC: 0.9 MG/DL (ref 0–1.2)
BILIRUB UR QL STRIP: NEGATIVE
BUN SERPL-MCNC: 11 MG/DL (ref 6–20)
BUN/CREAT SERPL: 8.5 (ref 7–25)
CALCIUM SPEC-SCNC: 9.7 MG/DL (ref 8.6–10.5)
CHLORIDE SERPL-SCNC: 104 MMOL/L (ref 98–107)
CHOLEST SERPL-MCNC: 229 MG/DL (ref 0–200)
CLARITY UR: CLEAR
CO2 SERPL-SCNC: 28.1 MMOL/L (ref 22–29)
COLOR UR: YELLOW
CREAT SERPL-MCNC: 1.29 MG/DL (ref 0.76–1.27)
CREAT UR-MCNC: 129.3 MG/DL
DEPRECATED RDW RBC AUTO: 47.9 FL (ref 37–54)
EGFRCR SERPLBLD CKD-EPI 2021: 67.1 ML/MIN/1.73
ERYTHROCYTE [DISTWIDTH] IN BLOOD BY AUTOMATED COUNT: 14.3 % (ref 12.3–15.4)
FOLATE SERPL-MCNC: 9.24 NG/ML (ref 4.78–24.2)
GLOBULIN UR ELPH-MCNC: 2.9 GM/DL
GLUCOSE SERPL-MCNC: 79 MG/DL (ref 65–99)
GLUCOSE UR STRIP-MCNC: NEGATIVE MG/DL
HBA1C MFR BLD: 5.1 % (ref 4.8–5.6)
HCT VFR BLD AUTO: 40.9 % (ref 37.5–51)
HDLC SERPL-MCNC: 40 MG/DL (ref 40–60)
HGB BLD-MCNC: 13.2 G/DL (ref 13–17.7)
HGB UR QL STRIP.AUTO: NEGATIVE
HOLD SPECIMEN: NORMAL
KETONES UR QL STRIP: NEGATIVE
LDLC SERPL CALC-MCNC: 165 MG/DL (ref 0–100)
LDLC/HDLC SERPL: 4.06 {RATIO}
LEUKOCYTE ESTERASE UR QL STRIP.AUTO: NEGATIVE
MCH RBC QN AUTO: 29.4 PG (ref 26.6–33)
MCHC RBC AUTO-ENTMCNC: 32.3 G/DL (ref 31.5–35.7)
MCV RBC AUTO: 91.1 FL (ref 79–97)
MICROALBUMIN/CREAT UR: 48 MG/G (ref 0–29)
NITRITE UR QL STRIP: NEGATIVE
PH UR STRIP.AUTO: 6 [PH] (ref 5–8)
PLATELET # BLD AUTO: 188 10*3/MM3 (ref 140–450)
PMV BLD AUTO: 10.1 FL (ref 6–12)
POTASSIUM SERPL-SCNC: 4 MMOL/L (ref 3.5–5.2)
PROT SERPL-MCNC: 7.5 G/DL (ref 6–8.5)
PROT UR QL STRIP: ABNORMAL
PSA SERPL-MCNC: 1.21 NG/ML (ref 0–4)
RBC # BLD AUTO: 4.49 10*6/MM3 (ref 4.14–5.8)
SODIUM SERPL-SCNC: 142 MMOL/L (ref 136–145)
SP GR UR STRIP: 1.01 (ref 1–1.03)
T4 FREE SERPL-MCNC: 1.57 NG/DL (ref 0.92–1.68)
TESTOST SERPL-MCNC: 222 NG/DL (ref 193–740)
TRIGL SERPL-MCNC: 134 MG/DL (ref 0–150)
TSH SERPL DL<=0.05 MIU/L-ACNC: 2.06 UIU/ML (ref 0.27–4.2)
URATE SERPL-MCNC: 8.6 MG/DL (ref 3.4–7)
UROBILINOGEN UR QL STRIP: ABNORMAL
VIT B12 BLD-MCNC: 402 PG/ML (ref 211–946)
VLDLC SERPL-MCNC: 24 MG/DL (ref 5–40)
WBC NRBC COR # BLD AUTO: 4.65 10*3/MM3 (ref 3.4–10.8)

## 2025-08-05 PROCEDURE — 84443 ASSAY THYROID STIM HORMONE: CPT

## 2025-08-05 PROCEDURE — 84550 ASSAY OF BLOOD/URIC ACID: CPT

## 2025-08-05 PROCEDURE — 80053 COMPREHEN METABOLIC PANEL: CPT

## 2025-08-05 PROCEDURE — 82570 ASSAY OF URINE CREATININE: CPT

## 2025-08-05 PROCEDURE — 83036 HEMOGLOBIN GLYCOSYLATED A1C: CPT

## 2025-08-05 PROCEDURE — 82746 ASSAY OF FOLIC ACID SERUM: CPT

## 2025-08-05 PROCEDURE — 84403 ASSAY OF TOTAL TESTOSTERONE: CPT

## 2025-08-05 PROCEDURE — 82607 VITAMIN B-12: CPT

## 2025-08-05 PROCEDURE — 80061 LIPID PANEL: CPT

## 2025-08-05 PROCEDURE — 36415 COLL VENOUS BLD VENIPUNCTURE: CPT

## 2025-08-05 PROCEDURE — 85027 COMPLETE CBC AUTOMATED: CPT

## 2025-08-05 PROCEDURE — 81003 URINALYSIS AUTO W/O SCOPE: CPT

## 2025-08-05 PROCEDURE — 84439 ASSAY OF FREE THYROXINE: CPT

## 2025-08-05 PROCEDURE — G0103 PSA SCREENING: HCPCS

## 2025-08-05 PROCEDURE — 82043 UR ALBUMIN QUANTITATIVE: CPT

## 2025-08-06 DIAGNOSIS — E11.9 TYPE 2 DIABETES MELLITUS WITHOUT COMPLICATION, UNSPECIFIED WHETHER LONG TERM INSULIN USE: ICD-10-CM

## 2025-08-06 RX ORDER — SEMAGLUTIDE 1.34 MG/ML
1 INJECTION, SOLUTION SUBCUTANEOUS WEEKLY
Qty: 3 ML | Refills: 5 | Status: SHIPPED | OUTPATIENT
Start: 2025-08-06

## 2025-08-06 RX ORDER — FEBUXOSTAT 40 MG/1
40 TABLET, FILM COATED ORAL DAILY
Qty: 90 TABLET | Refills: 1 | Status: SHIPPED | OUTPATIENT
Start: 2025-08-06

## (undated) DEVICE — CONN JET HYDRA H20 AUXILIARY DISP

## (undated) DEVICE — LINER SURG CANSTR SXN S/RIGD 1500CC

## (undated) DEVICE — GLV SURG SENSICARE PI LF PF 7.5 GRN STRL

## (undated) DEVICE — SOLIDIFIER LIQLOC PLS 1500CC BT

## (undated) DEVICE — SOL IRR H2O BTL 1000ML STRL

## (undated) DEVICE — STERILE POLYISOPRENE POWDER-FREE SURGICAL GLOVES: Brand: PROTEXIS

## (undated) DEVICE — Device

## (undated) DEVICE — Device: Brand: DEFENDO AIR/WATER/SUCTION AND BIOPSY VALVE

## (undated) DEVICE — TUBING, SUCTION, 1/4" X 10', STRAIGHT: Brand: MEDLINE

## (undated) DEVICE — SOL IRRG H2O PL/BG 1000ML STRL